# Patient Record
Sex: MALE | Race: WHITE | Employment: OTHER | URBAN - METROPOLITAN AREA
[De-identification: names, ages, dates, MRNs, and addresses within clinical notes are randomized per-mention and may not be internally consistent; named-entity substitution may affect disease eponyms.]

---

## 2023-02-15 ENCOUNTER — ANESTHESIA (OUTPATIENT)
Dept: OPERATING ROOM | Age: 63
End: 2023-02-15
Payer: COMMERCIAL

## 2023-02-15 ENCOUNTER — HOSPITAL ENCOUNTER (OUTPATIENT)
Age: 63
Setting detail: OUTPATIENT SURGERY
Discharge: HOME | End: 2023-02-15
Attending: ORTHOPAEDIC SURGERY | Admitting: ORTHOPAEDIC SURGERY
Payer: COMMERCIAL

## 2023-02-15 ENCOUNTER — ANESTHESIA EVENT (OUTPATIENT)
Dept: OPERATING ROOM | Age: 63
End: 2023-02-15
Payer: COMMERCIAL

## 2023-02-15 ENCOUNTER — HOSPITAL ENCOUNTER (OUTPATIENT)
Dept: GENERAL RADIOLOGY | Age: 63
Setting detail: OUTPATIENT SURGERY
Discharge: HOME | End: 2023-02-17
Attending: ORTHOPAEDIC SURGERY
Payer: COMMERCIAL

## 2023-02-15 VITALS
DIASTOLIC BLOOD PRESSURE: 84 MMHG | HEIGHT: 75 IN | HEART RATE: 60 BPM | WEIGHT: 180 LBS | BODY MASS INDEX: 22.38 KG/M2 | OXYGEN SATURATION: 99 % | TEMPERATURE: 97.4 F | SYSTOLIC BLOOD PRESSURE: 139 MMHG | RESPIRATION RATE: 18 BRPM

## 2023-02-15 DIAGNOSIS — R52 PAIN: ICD-10-CM

## 2023-02-15 LAB
GLUCOSE BLD-MCNC: 126 MG/DL (ref 70–99)
PERFORMED ON: ABNORMAL

## 2023-02-15 PROCEDURE — 2580000003 HC RX 258: Performed by: ORTHOPAEDIC SURGERY

## 2023-02-15 PROCEDURE — 3600000012 HC SURGERY LEVEL 2 ADDTL 15MIN: Performed by: ORTHOPAEDIC SURGERY

## 2023-02-15 PROCEDURE — 7100000010 HC PHASE II RECOVERY - FIRST 15 MIN: Performed by: ORTHOPAEDIC SURGERY

## 2023-02-15 PROCEDURE — 3700000000 HC ANESTHESIA ATTENDED CARE: Performed by: ORTHOPAEDIC SURGERY

## 2023-02-15 PROCEDURE — 2500000003 HC RX 250 WO HCPCS: Performed by: ORTHOPAEDIC SURGERY

## 2023-02-15 PROCEDURE — 2709999900 HC NON-CHARGEABLE SUPPLY: Performed by: ORTHOPAEDIC SURGERY

## 2023-02-15 PROCEDURE — 3600000002 HC SURGERY LEVEL 2 BASE: Performed by: ORTHOPAEDIC SURGERY

## 2023-02-15 PROCEDURE — 3209999900 FLUORO FOR SURGICAL PROCEDURES

## 2023-02-15 PROCEDURE — 6360000002 HC RX W HCPCS: Performed by: ORTHOPAEDIC SURGERY

## 2023-02-15 PROCEDURE — A4217 STERILE WATER/SALINE, 500 ML: HCPCS | Performed by: ORTHOPAEDIC SURGERY

## 2023-02-15 PROCEDURE — 2580000003 HC RX 258: Performed by: ANESTHESIOLOGY

## 2023-02-15 PROCEDURE — 3700000001 HC ADD 15 MINUTES (ANESTHESIA): Performed by: ORTHOPAEDIC SURGERY

## 2023-02-15 RX ORDER — GLUCAGON 1 MG/ML
1 KIT INJECTION PRN
Status: CANCELLED | OUTPATIENT
Start: 2023-02-15

## 2023-02-15 RX ORDER — LIDOCAINE HYDROCHLORIDE 10 MG/ML
10 INJECTION, SOLUTION INFILTRATION; PERINEURAL
Status: COMPLETED | OUTPATIENT
Start: 2023-02-15 | End: 2023-02-15

## 2023-02-15 RX ORDER — LABETALOL HYDROCHLORIDE 5 MG/ML
10 INJECTION, SOLUTION INTRAVENOUS
Status: CANCELLED | OUTPATIENT
Start: 2023-02-15

## 2023-02-15 RX ORDER — IPRATROPIUM BROMIDE AND ALBUTEROL SULFATE 2.5; .5 MG/3ML; MG/3ML
1 SOLUTION RESPIRATORY (INHALATION)
Status: CANCELLED | OUTPATIENT
Start: 2023-02-15 | End: 2023-02-16

## 2023-02-15 RX ORDER — ONDANSETRON 2 MG/ML
4 INJECTION INTRAMUSCULAR; INTRAVENOUS
Status: CANCELLED | OUTPATIENT
Start: 2023-02-15 | End: 2023-02-16

## 2023-02-15 RX ORDER — LIDOCAINE HYDROCHLORIDE 10 MG/ML
2 INJECTION, SOLUTION EPIDURAL; INFILTRATION; INTRACAUDAL; PERINEURAL ONCE
Status: DISCONTINUED | OUTPATIENT
Start: 2023-02-15 | End: 2023-02-15 | Stop reason: HOSPADM

## 2023-02-15 RX ORDER — FENTANYL CITRATE 0.05 MG/ML
25 INJECTION, SOLUTION INTRAMUSCULAR; INTRAVENOUS EVERY 5 MIN PRN
Status: CANCELLED | OUTPATIENT
Start: 2023-02-15

## 2023-02-15 RX ORDER — MAGNESIUM HYDROXIDE 1200 MG/15ML
LIQUID ORAL CONTINUOUS PRN
Status: COMPLETED | OUTPATIENT
Start: 2023-02-15 | End: 2023-02-15

## 2023-02-15 RX ORDER — SODIUM CHLORIDE 9 MG/ML
25 INJECTION, SOLUTION INTRAVENOUS PRN
Status: CANCELLED | OUTPATIENT
Start: 2023-02-15

## 2023-02-15 RX ORDER — HYDRALAZINE HYDROCHLORIDE 20 MG/ML
10 INJECTION INTRAMUSCULAR; INTRAVENOUS
Status: CANCELLED | OUTPATIENT
Start: 2023-02-15

## 2023-02-15 RX ORDER — CEFAZOLIN SODIUM IN 0.9 % NACL 2 G/100 ML
2000 PLASTIC BAG, INJECTION (ML) INTRAVENOUS ONCE
Status: COMPLETED | OUTPATIENT
Start: 2023-02-15 | End: 2023-02-15

## 2023-02-15 RX ORDER — ASPIRIN 81 MG/1
81 TABLET, CHEWABLE ORAL DAILY
COMMUNITY

## 2023-02-15 RX ORDER — BUPIVACAINE HYDROCHLORIDE 5 MG/ML
30 INJECTION, SOLUTION EPIDURAL; INTRACAUDAL
Status: COMPLETED | OUTPATIENT
Start: 2023-02-15 | End: 2023-02-15

## 2023-02-15 RX ORDER — METOCLOPRAMIDE HYDROCHLORIDE 5 MG/ML
10 INJECTION INTRAMUSCULAR; INTRAVENOUS
Status: CANCELLED | OUTPATIENT
Start: 2023-02-15 | End: 2023-02-16

## 2023-02-15 RX ORDER — HYDROCODONE BITARTRATE AND ACETAMINOPHEN 5; 325 MG/1; MG/1
1 TABLET ORAL EVERY 6 HOURS PRN
COMMUNITY

## 2023-02-15 RX ORDER — AMLODIPINE BESYLATE 5 MG/1
5 TABLET ORAL DAILY
COMMUNITY

## 2023-02-15 RX ORDER — SODIUM CHLORIDE 0.9 % (FLUSH) 0.9 %
5-40 SYRINGE (ML) INJECTION EVERY 12 HOURS SCHEDULED
Status: CANCELLED | OUTPATIENT
Start: 2023-02-15

## 2023-02-15 RX ORDER — DEXTROSE MONOHYDRATE 100 MG/ML
INJECTION, SOLUTION INTRAVENOUS CONTINUOUS PRN
Status: CANCELLED | OUTPATIENT
Start: 2023-02-15

## 2023-02-15 RX ORDER — SODIUM CHLORIDE, SODIUM LACTATE, POTASSIUM CHLORIDE, CALCIUM CHLORIDE 600; 310; 30; 20 MG/100ML; MG/100ML; MG/100ML; MG/100ML
INJECTION, SOLUTION INTRAVENOUS CONTINUOUS
Status: DISCONTINUED | OUTPATIENT
Start: 2023-02-15 | End: 2023-02-15 | Stop reason: HOSPADM

## 2023-02-15 RX ORDER — SODIUM CHLORIDE 0.9 % (FLUSH) 0.9 %
5-40 SYRINGE (ML) INJECTION PRN
Status: CANCELLED | OUTPATIENT
Start: 2023-02-15

## 2023-02-15 RX ADMIN — LIDOCAINE HYDROCHLORIDE 10 ML: 10 INJECTION, SOLUTION INFILTRATION; PERINEURAL at 09:08

## 2023-02-15 RX ADMIN — BUPIVACAINE HYDROCHLORIDE 10 ML: 5 INJECTION, SOLUTION EPIDURAL; INTRACAUDAL; PERINEURAL at 09:07

## 2023-02-15 RX ADMIN — SODIUM CHLORIDE, POTASSIUM CHLORIDE, SODIUM LACTATE AND CALCIUM CHLORIDE 1000 ML: 600; 310; 30; 20 INJECTION, SOLUTION INTRAVENOUS at 08:40

## 2023-02-15 RX ADMIN — Medication 2000 MG: at 09:49

## 2023-02-15 ASSESSMENT — PAIN - FUNCTIONAL ASSESSMENT: PAIN_FUNCTIONAL_ASSESSMENT: 0-10

## 2023-02-15 ASSESSMENT — PAIN SCALES - GENERAL: PAINLEVEL_OUTOF10: 0

## 2023-02-15 ASSESSMENT — PAIN DESCRIPTION - DESCRIPTORS: DESCRIPTORS: THROBBING

## 2023-02-15 NOTE — ANESTHESIA PRE PROCEDURE
Department of Anesthesiology  Preprocedure Note       Name:  Nomi Oh   Age:  57 y.o.  :  1960                                          MRN:  [id number]         Date:  2/15/2023      Surgeon: Ramírez Christine):  Suzanne Montiel DO    Procedure: Procedure(s):  LEFT INDEX FINGER EXCISIONAL DEBRIDEMENT AND REVISION OF AMPUTATION TO LEFT INDEX FINGER AND THUMB SUPINE, DIGITAL BLOCK X2, C-ARM  MAC + LOCAL    Medications prior to admission:   Prior to Admission medications    Medication Sig Start Date End Date Taking? Authorizing Provider   amLODIPine (NORVASC) 5 MG tablet Take 5 mg by mouth daily   Yes Historical Provider, MD   HYDROcodone-acetaminophen (NORCO) 5-325 MG per tablet Take 1 tablet by mouth every 6 hours as needed for Pain. Yes Historical Provider, MD   aspirin 81 MG chewable tablet Take 81 mg by mouth daily   Yes Historical Provider, MD       Current medications:    Current Facility-Administered Medications   Medication Dose Route Frequency Provider Last Rate Last Admin    lidocaine 1 % injection 10 mL  10 mL IntraDERmal Once PRN Duarte Valdes DO        ceFAZolin (ANCEF) 2000 mg in 0.9% sodium chloride 100 mL IVPB  2,000 mg IntraVENous Once Duarte Valdes DO        lactated ringers IV soln infusion   IntraVENous Continuous Nancy Barron  mL/hr at 02/15/23 0840 1,000 mL at 02/15/23 0840    lidocaine PF 1 % injection 2 mL  2 mL IntraDERmal Once Nancy Barron MD           Allergies:  No Known Allergies    Problem List:  There is no problem list on file for this patient. Past Medical History:  History reviewed. No pertinent past medical history. Past Surgical History:  No past surgical history on file.     Social History:    Social History     Tobacco Use    Smoking status: Not on file    Smokeless tobacco: Not on file   Substance Use Topics    Alcohol use: Not on file                                Counseling given: Not Answered      Vital Signs (Current):   Vitals: 02/15/23 0822   BP: (!) 187/81   Pulse: 57   Resp: 20   Temp: 97.4 °F (36.3 °C)   TempSrc: Temporal   SpO2: 99%   Weight: 180 lb (81.6 kg)   Height: 6' 3\" (1.905 m)                                              BP Readings from Last 3 Encounters:   02/15/23 (!) 187/81       NPO Status: Time of last liquid consumption: 0000                        Time of last solid consumption: 0000                        Date of last liquid consumption: 02/15/23                        Date of last solid food consumption: 02/15/23    BMI:   Wt Readings from Last 3 Encounters:   02/15/23 180 lb (81.6 kg)     Body mass index is 22.5 kg/m².     CBC: No results found for: WBC, RBC, HGB, HCT, MCV, RDW, PLT    CMP: No results found for: NA, K, CL, CO2, BUN, CREATININE, GFRAA, AGRATIO, LABGLOM, GLUCOSE, GLU, PROT, CALCIUM, BILITOT, ALKPHOS, AST, ALT    POC Tests:   Recent Labs     02/15/23  0832   POCGLU 126*       Coags: No results found for: PROTIME, INR, APTT    HCG (If Applicable): No results found for: PREGTESTUR, PREGSERUM, HCG, HCGQUANT     ABGs: No results found for: PHART, PO2ART, MGP2UZG, XIM7UWY, BEART, V4KDRBTE     Type & Screen (If Applicable):  No results found for: LABABO, LABRH    Drug/Infectious Status (If Applicable):  No results found for: HIV, HEPCAB    COVID-19 Screening (If Applicable): No results found for: COVID19        Anesthesia Evaluation  Patient summary reviewed and Nursing notes reviewed no history of anesthetic complications:   Airway: Mallampati: II  TM distance: >3 FB   Neck ROM: full  Mouth opening: > = 3 FB   Dental: normal exam         Pulmonary:Negative Pulmonary ROS and normal exam                               Cardiovascular:Negative CV ROS  Exercise tolerance: good (>4 METS),         ECG reviewed               Beta Blocker:  Not on Beta Blocker         Neuro/Psych:   Negative Neuro/Psych ROS              GI/Hepatic/Renal: Neg GI/Hepatic/Renal ROS            Endo/Other: Negative Endo/Other ROS Pt had PAT visit. Abdominal:             Vascular: negative vascular ROS. Other Findings:           Anesthesia Plan      MAC     ASA 2       Induction: intravenous. MIPS: Prophylactic antiemetics administered. Anesthetic plan and risks discussed with patient. Plan discussed with CRNA.     Attending anesthesiologist reviewed and agrees with Pre Eval content                Verna Woodard MD   2/15/2023

## 2023-02-15 NOTE — ANESTHESIA POSTPROCEDURE EVALUATION
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     Frances Phase I: Frances Score: 10    Frances Phase II:        Anesthesia Post Evaluation    Patient location during evaluation: bedside  Patient participation: complete - patient participated  Level of consciousness: awake and alert  Airway patency: patent  Nausea & Vomiting: no nausea and no vomiting  Complications: no  Cardiovascular status: blood pressure returned to baseline and hemodynamically stable  Respiratory status: acceptable  Hydration status: euvolemic

## 2023-02-15 NOTE — PROGRESS NOTES
Discharge instructions reviewed with patient and daughter. All personal belongings and discharge instructions taken with patient.

## 2023-02-15 NOTE — PROGRESS NOTES
Patient ID:  Stevie Arnold  [id number]  22 y.o.  1960  BOVIE PAD SITE CLEAR AND INTACT PRE AND POST OP. TAKEN TO PACU,   ATTACHED TO MONITOR AND REPORT GIVEN TO RN.   VSS DRSG DRY AND INTACT  GREEN TOURNI-COT APPLIED TO LEFT THUMB AT 1007  GREEN TOURNI-COT REMOVED FROM LEFT THUMB AT 1031  ORANGE TOURNI-COT APPLIED TO LEFT INDEX FINGER [id number]  ORANGE TOURNI-COT REMOVED FROM LEFT INDEX FINGER AT 1032    Electronically signed by Kalani Marrero RN on 2/15/2023

## 2023-02-15 NOTE — OP NOTE
Operative Note      Patient: Xi Baer  YOB: 1960  MRN: [id number]    Date of Procedure: 2/15/2023        Preoperative diagnosis: Left thumb and index finger necrosis at the tip    Postoperative diagnosis: Same     Procedure planned: Amputation left thumb and index finger through distal phalanx with primary closure    Procedure performed: Same    Surgeon: Dewayne Davila D.O. Assistant: HAKEEM Sanches  The physician assistant was present through the entire case. Given the nature of the disease process and the procedure to be performed a skilled surgical assistant was necessary during the case. The assistant was necessary in order to hold retractors and directly assist in the operation. A certified scrub tech was at the back table managing instruments and supplies for the surgical case. Anesthesia: Digital block monitored by the anesthesia team    Estimated blood loss: Less than 10 cc    Drains: None    Tourniquet: Turnicot's to both thumb and index for less than 30 minutes    Specimens: None    Implants: None    Indications: The patient presented with dry gangrene to the tip of his left index and thumb. Treatment options were discussed. Ultimately the patient elected to proceed forth with surgery by way of amputation to the tip of each digit to remove the necrotic tissue. Informed consent was signed and placed in the chart. Complications: None noted at the time of surgery     Description of operation: The patient was taken to the operative suite and placed in the supine position on the operating table. A timeout was performed and the left thumb and index confirmed to be the operative site. The patient was carefully positioned on the table in such a fashion as to pad all bony prominences and peripheral nerves. Patient was administered appropriate IV antibiotics and the digital blocks were working well. He was prepped and draped in the normal sterile fashion.   Attention was first turned to the thumb. A turnicot was placed at the level of the interphalangeal joint. A freer elevator device was used to remove the nail plate from the thumb using atraumatic techniques. The zone of full-thickness necrosis of skin and subcutaneous tissue was sharply removed with the 15 blade. The rongeur was then used to shorten the distal phalanx in an oblique fashion to allow for redundancy of soft tissue that would allow for primary closure. Attention was then turned to the index. Again the freer elevator device was used to atraumatically remove the nail plate. The zone of skin and soft tissue necrosis was removed with the 15 blade and rongeur. The rongeur was then used to shorten the distal phalanx to allow for redundancy of the soft tissues to allow for a tension-free primary closure. Irrigation was performed to each digit. The nailbed tissue of the thumb was then closed to the more palmar soft tissues achieving a tension-free closure. The nailbed tissue of the left index finger was then sewn to the palmar soft tissue envelope to the index achieving a tension-free closure. The turnicot's were relieved and hemostasis and viability confirmed. Loosely applied bulky soft dressings were placed. The patient was then allowed to head to recovery in stable condition. Overall the patient tolerated the procedure well.      Disposition: Stable to PACU               Electronically signed by Ricardo Quintanilla DO on 2/15/2023 at 11:34 AM

## 2023-04-02 PROBLEM — M79.641 PAIN IN BOTH HANDS: Status: ACTIVE | Noted: 2023-04-02

## 2023-04-02 PROBLEM — M79.642 PAIN IN BOTH HANDS: Status: ACTIVE | Noted: 2023-04-02

## 2023-04-02 PROBLEM — I73.00 RAYNAUD'S DISEASE WITHOUT GANGRENE: Status: ACTIVE | Noted: 2023-04-02

## 2023-04-02 PROBLEM — E11.9 DIABETES MELLITUS (MULTI): Status: ACTIVE | Noted: 2023-04-02

## 2023-04-02 PROBLEM — S61.208A OPEN WOUND OF INDEX FINGER: Status: ACTIVE | Noted: 2023-04-02

## 2023-04-02 PROBLEM — R31.9 HEMATURIA: Status: ACTIVE | Noted: 2023-04-02

## 2023-04-02 PROBLEM — L03.012 CELLULITIS OF LEFT INDEX FINGER: Status: ACTIVE | Noted: 2023-04-02

## 2023-04-02 PROBLEM — K80.50 CHOLEDOCHOLITHIASIS: Status: ACTIVE | Noted: 2023-04-02

## 2023-04-02 PROBLEM — L72.3 SEBACEOUS CYST: Status: ACTIVE | Noted: 2023-04-02

## 2023-04-02 PROBLEM — D13.5 AMPULLARY ADENOMA: Status: ACTIVE | Noted: 2023-04-02

## 2023-04-02 PROBLEM — F17.210 TOBACCO DEPENDENCE DUE TO CIGARETTES: Status: ACTIVE | Noted: 2023-04-02

## 2023-04-02 PROBLEM — L90.5 SCAR OF NOSE: Status: ACTIVE | Noted: 2023-04-02

## 2023-04-02 PROBLEM — M51.369 DISC DEGENERATION, LUMBAR: Status: ACTIVE | Noted: 2023-04-02

## 2023-04-02 PROBLEM — K83.1: Status: ACTIVE | Noted: 2023-04-02

## 2023-04-02 PROBLEM — K86.1 CHRONIC PANCREATITIS (MULTI): Status: ACTIVE | Noted: 2023-04-02

## 2023-04-02 PROBLEM — M79.645 PAIN OF FINGER OF LEFT HAND: Status: ACTIVE | Noted: 2023-04-02

## 2023-04-02 PROBLEM — K83.1 BILIARY STRICTURE (CMS-HCC): Status: ACTIVE | Noted: 2023-04-02

## 2023-04-02 PROBLEM — K61.1 PERIRECTAL ABSCESS: Status: ACTIVE | Noted: 2023-04-02

## 2023-04-02 PROBLEM — L08.9 FINGER INFECTION: Status: ACTIVE | Noted: 2023-04-02

## 2023-04-02 PROBLEM — R97.20 ELEVATED PSA: Status: ACTIVE | Noted: 2023-04-02

## 2023-04-02 PROBLEM — M51.36 DISC DEGENERATION, LUMBAR: Status: ACTIVE | Noted: 2023-04-02

## 2023-04-02 PROBLEM — D12.6 SERRATED ADENOMA OF COLON: Status: ACTIVE | Noted: 2023-04-02

## 2023-04-02 PROBLEM — R93.1 HIGH CORONARY ARTERY CALCIUM SCORE: Status: ACTIVE | Noted: 2023-04-02

## 2023-04-02 RX ORDER — AMLODIPINE BESYLATE 10 MG/1
10 TABLET ORAL DAILY
COMMUNITY
End: 2024-02-10 | Stop reason: SDUPTHER

## 2023-04-02 RX ORDER — MUPIROCIN 20 MG/G
OINTMENT TOPICAL
COMMUNITY
End: 2024-05-23 | Stop reason: WASHOUT

## 2023-04-03 ENCOUNTER — LAB (OUTPATIENT)
Dept: LAB | Facility: LAB | Age: 63
End: 2023-04-03
Payer: MEDICARE

## 2023-04-03 ENCOUNTER — OFFICE VISIT (OUTPATIENT)
Dept: PRIMARY CARE | Facility: CLINIC | Age: 63
End: 2023-04-03
Payer: MEDICARE

## 2023-04-03 DIAGNOSIS — M79.641 PAIN IN BOTH HANDS: ICD-10-CM

## 2023-04-03 DIAGNOSIS — L03.012 CELLULITIS OF LEFT INDEX FINGER: ICD-10-CM

## 2023-04-03 DIAGNOSIS — I73.00 RAYNAUD'S DISEASE WITHOUT GANGRENE: ICD-10-CM

## 2023-04-03 DIAGNOSIS — M79.642 PAIN IN BOTH HANDS: ICD-10-CM

## 2023-04-03 DIAGNOSIS — L08.9 FINGER INFECTION: Primary | ICD-10-CM

## 2023-04-03 DIAGNOSIS — L03.011: ICD-10-CM

## 2023-04-03 DIAGNOSIS — Z00.00 ROUTINE HEALTH MAINTENANCE: ICD-10-CM

## 2023-04-03 DIAGNOSIS — L60.9 FINGERNAIL ABNORMALITIES: ICD-10-CM

## 2023-04-03 LAB
ALANINE AMINOTRANSFERASE (SGPT) (U/L) IN SER/PLAS: 15 U/L (ref 10–52)
ALBUMIN (G/DL) IN SER/PLAS: 4.5 G/DL (ref 3.4–5)
ALKALINE PHOSPHATASE (U/L) IN SER/PLAS: 65 U/L (ref 33–136)
ANION GAP IN SER/PLAS: 11 MMOL/L (ref 10–20)
ASPARTATE AMINOTRANSFERASE (SGOT) (U/L) IN SER/PLAS: 14 U/L (ref 9–39)
BILIRUBIN TOTAL (MG/DL) IN SER/PLAS: 0.3 MG/DL (ref 0–1.2)
C REACTIVE PROTEIN (MG/L) IN SER/PLAS: <0.1 MG/DL
CALCIUM (MG/DL) IN SER/PLAS: 9.8 MG/DL (ref 8.6–10.3)
CARBON DIOXIDE, TOTAL (MMOL/L) IN SER/PLAS: 28 MMOL/L (ref 21–32)
CHLORIDE (MMOL/L) IN SER/PLAS: 103 MMOL/L (ref 98–107)
CREATININE (MG/DL) IN SER/PLAS: 0.71 MG/DL (ref 0.5–1.3)
ERYTHROCYTE DISTRIBUTION WIDTH (RATIO) BY AUTOMATED COUNT: 15.8 % (ref 11.5–14.5)
ERYTHROCYTE MEAN CORPUSCULAR HEMOGLOBIN CONCENTRATION (G/DL) BY AUTOMATED: 33.8 G/DL (ref 32–36)
ERYTHROCYTE MEAN CORPUSCULAR VOLUME (FL) BY AUTOMATED COUNT: 101 FL (ref 80–100)
ERYTHROCYTES (10*6/UL) IN BLOOD BY AUTOMATED COUNT: 3.57 X10E12/L (ref 4.5–5.9)
GFR MALE: >90 ML/MIN/1.73M2
GLUCOSE (MG/DL) IN SER/PLAS: 150 MG/DL (ref 74–99)
HEMATOCRIT (%) IN BLOOD BY AUTOMATED COUNT: 36.1 % (ref 41–52)
HEMOGLOBIN (G/DL) IN BLOOD: 12.2 G/DL (ref 13.5–17.5)
LEUKOCYTES (10*3/UL) IN BLOOD BY AUTOMATED COUNT: 8.9 X10E9/L (ref 4.4–11.3)
PLATELETS (10*3/UL) IN BLOOD AUTOMATED COUNT: 284 X10E9/L (ref 150–450)
POTASSIUM (MMOL/L) IN SER/PLAS: 4.3 MMOL/L (ref 3.5–5.3)
PROTEIN TOTAL: 7.2 G/DL (ref 6.4–8.2)
SEDIMENTATION RATE, ERYTHROCYTE: 14 MM/H (ref 0–20)
SODIUM (MMOL/L) IN SER/PLAS: 138 MMOL/L (ref 136–145)
UREA NITROGEN (MG/DL) IN SER/PLAS: 9 MG/DL (ref 6–23)

## 2023-04-03 PROCEDURE — 99214 OFFICE O/P EST MOD 30 MIN: CPT | Performed by: STUDENT IN AN ORGANIZED HEALTH CARE EDUCATION/TRAINING PROGRAM

## 2023-04-03 PROCEDURE — 1036F TOBACCO NON-USER: CPT | Performed by: STUDENT IN AN ORGANIZED HEALTH CARE EDUCATION/TRAINING PROGRAM

## 2023-04-03 PROCEDURE — 85027 COMPLETE CBC AUTOMATED: CPT

## 2023-04-03 PROCEDURE — 85652 RBC SED RATE AUTOMATED: CPT

## 2023-04-03 PROCEDURE — 80053 COMPREHEN METABOLIC PANEL: CPT

## 2023-04-03 PROCEDURE — 86140 C-REACTIVE PROTEIN: CPT

## 2023-04-03 PROCEDURE — 36415 COLL VENOUS BLD VENIPUNCTURE: CPT

## 2023-04-03 RX ORDER — CEPHALEXIN 500 MG/1
500 CAPSULE ORAL 4 TIMES DAILY
Qty: 28 CAPSULE | Refills: 0 | Status: SHIPPED | OUTPATIENT
Start: 2023-04-03 | End: 2023-04-10

## 2023-04-03 RX ORDER — DOXYCYCLINE 100 MG/1
100 CAPSULE ORAL 2 TIMES DAILY
Qty: 14 CAPSULE | Refills: 0 | Status: SHIPPED | OUTPATIENT
Start: 2023-04-03 | End: 2023-04-10

## 2023-04-03 ASSESSMENT — ENCOUNTER SYMPTOMS
SHORTNESS OF BREATH: 0
WEAKNESS: 0
CHILLS: 0
VOMITING: 0
DIAPHORESIS: 0
NUMBNESS: 0
NAUSEA: 0
FEVER: 0

## 2023-04-03 ASSESSMENT — PATIENT HEALTH QUESTIONNAIRE - PHQ9
SUM OF ALL RESPONSES TO PHQ9 QUESTIONS 1 AND 2: 0
2. FEELING DOWN, DEPRESSED OR HOPELESS: NOT AT ALL
1. LITTLE INTEREST OR PLEASURE IN DOING THINGS: NOT AT ALL

## 2023-04-03 NOTE — PROGRESS NOTES
Subjective   Patient ID: Vega Rowe is a 62 y.o. male who presents for Nail Problem.  He is here for an infection in his 2-4 right hand nails. It's been present for a few months. Areas of darker discoloration around nails and hasn't changed significantly. The nails hurt during the day and night. He uses hydrogen peroxide and an ointment over the counter for his fingers. He doesn't remember any inciting event for this. 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 Doesn't remember cutting his fingers. His right index finger hurts the most and started as a nail separation from the nail bed.         Review of Systems   Constitutional:  Negative for chills, diaphoresis and fever.   HENT:  Negative for hearing loss.    Eyes:  Negative for visual disturbance.   Respiratory:  Negative for shortness of breath.    Cardiovascular:  Negative for chest pain.   Gastrointestinal:  Negative for nausea and vomiting.   Skin:  Negative for rash.   Neurological:  Negative for weakness and numbness.       There were no vitals taken for this visit.    Objective   Physical Exam  Vitals reviewed.   Constitutional:       General: He is not in acute distress.     Appearance: Normal appearance.   HENT:      Head: Normocephalic.   Cardiovascular:      Rate and Rhythm: Normal rate and regular rhythm.   Pulmonary:      Effort: Pulmonary effort is normal. No respiratory distress.      Breath sounds: Normal breath sounds.   Abdominal:      General: There is no distension.   Musculoskeletal:         General: No deformity.   Skin:     Coloration: Skin is not jaundiced.      Comments: Right first and second digits with distal scabbing, no open wounds present.  Left second third and fourth digits with raised nailbeds thickening under nail beds, third left digit with slight drainage, tender to palpation, nailbeds with yellow and brownish discoloration in the middle of the nailbeds.  Right third finger with yellow and brownish  discolored nailbed and slightly thickened nail bed. Sensation grossly intact to both hands bilaterally.     Neurological:      Mental Status: He is alert.         Assessment/Plan   Problem List Items Addressed This Visit          Circulatory    Raynaud's disease without gangrene    Relevant Orders    Referral to Rheumatology    XR hand left 3+ views       Musculoskeletal    Cellulitis of left index finger    Relevant Orders    XR hand left 3+ views    Follow Up In [facility] - PCP    Referral to Dermatology    Finger infection - Primary    Relevant Medications    doxycycline (Vibramycin) 100 mg capsule    cephalexin (Keflex) 500 mg capsule    Other Relevant Orders    Referral to Wound Clinic    Pain in both hands    Relevant Orders    Referral to Rheumatology    Cellulitis of fingernail, right     Discussed that I am extremely concerned about his finger infection especially considering that because he lost insurance he was unable to follow-up with orthopedics as instructed.  The findings on his left nails seems consistent with a bacterial infection under the fingernails.  His right first and second digits have chronic findings that do not appear to be acutely infected but need to be seen by wound care and his orthopedic specialist as is out of my expertise to manage this further.  We will empirically treat with Keflex and doxycycline for 7 days.  I referred him to wound care, dermatology as the nails may not be an infection and could be some other cause, and rheumatology for the Raynaud's phenomenon.  I also ordered x-rays of his hands bilaterally as these were requested by his orthopedist a month and a half ago.  I ordered lab work to help rule out osteomyelitis in addition with the x-rays as above.  Discussed the immediate emergency to follow-up with orthopedics, wound care, and dermatology.  Advised him to call me with any questions or concerns in the meantime.  ER for fevers, chills, sweats.          Relevant Orders    CBC    Comprehensive Metabolic Panel    Sedimentation Rate    C-Reactive Protein    XR hand right 3+ views    Referral to Wound Clinic       Other    Routine health maintenance     Advised follow up with Dr. Sena in 1-2 months.         Relevant Orders    Follow Up In [facility] - PCP     Other Visit Diagnoses       Fingernail abnormalities        Relevant Orders    Referral to Dermatology

## 2023-04-03 NOTE — ASSESSMENT & PLAN NOTE
Discussed that I am extremely concerned about his finger infection especially considering that because he lost insurance he was unable to follow-up with orthopedics as instructed.  The findings on his left nails seems consistent with a bacterial infection under the fingernails.  His right first and second digits have chronic findings that do not appear to be acutely infected but need to be seen by wound care and his orthopedic specialist as is out of my expertise to manage this further.  We will empirically treat with Keflex and doxycycline for 7 days.  I referred him to wound care, dermatology as the nails may not be an infection and could be some other cause, and rheumatology for the Raynaud's phenomenon.  I also ordered x-rays of his hands bilaterally as these were requested by his orthopedist a month and a half ago.  I ordered lab work to help rule out osteomyelitis in addition with the x-rays as above.  Discussed the immediate emergency to follow-up with orthopedics, wound care, and dermatology.  Advised him to call me with any questions or concerns in the meantime.  ER for fevers, chills, sweats.

## 2023-04-04 ENCOUNTER — TELEPHONE (OUTPATIENT)
Dept: PRIMARY CARE | Facility: CLINIC | Age: 63
End: 2023-04-04
Payer: MEDICARE

## 2023-04-04 NOTE — TELEPHONE ENCOUNTER
----- Message from Laureano Wiggins DO sent at 4/4/2023  8:16 AM EDT -----  Please let him know the blood work doesn't show signs of a serious infection which is good. It shows some slightly elevated blood sugars and a slightly decreased hemoglobin, or red blood cells. They're not urgent and Dr. Sena can discuss at next appointment.

## 2023-04-05 ENCOUNTER — TELEPHONE (OUTPATIENT)
Dept: PRIMARY CARE | Facility: CLINIC | Age: 63
End: 2023-04-05
Payer: MEDICARE

## 2023-05-03 ENCOUNTER — APPOINTMENT (OUTPATIENT)
Dept: PRIMARY CARE | Facility: CLINIC | Age: 63
End: 2023-05-03
Payer: MEDICARE

## 2023-06-06 PROBLEM — I96: Status: ACTIVE | Noted: 2023-06-06

## 2023-06-06 PROBLEM — I99.8 ISCHEMIA OF DIGITS OF HAND: Status: ACTIVE | Noted: 2023-06-06

## 2023-06-06 PROBLEM — I10 ACCELERATED HYPERTENSION: Status: ACTIVE | Noted: 2023-06-06

## 2023-06-06 PROBLEM — R09.89 BILATERAL CAROTID BRUITS: Status: ACTIVE | Noted: 2023-06-06

## 2023-06-06 PROBLEM — G89.18 POST-OP PAIN: Status: ACTIVE | Noted: 2023-06-06

## 2023-06-06 RX ORDER — IBUPROFEN 600 MG/1
600 TABLET ORAL 3 TIMES DAILY
COMMUNITY

## 2023-06-06 RX ORDER — NITROGLYCERIN 20 MG/G
0.5 OINTMENT TOPICAL EVERY 6 HOURS SCHEDULED
COMMUNITY
Start: 2023-05-03 | End: 2023-10-27 | Stop reason: SDUPTHER

## 2023-06-06 NOTE — PROGRESS NOTES
Subjective   Patient ID: Vega Rowe is a 63 y.o. male who presents for his Welcome to Medicare Assessment and follow up S/p revision of amputation and excisional debridement to left index finger and left thumb 2/15/2023 w/ Dr Fonseca           He quit smoking after all the issues with necrosis to his finger tips     He drinks approx 6 beers daily. He admits he is drinking more due to the pain he is in  He is taking Tylenol for pain also   His fingers burn terribly at night and the slightest touch to something is painful     Last seen in 2023 at [facility]. He has new Rx for glasses.    He did not let them dilate his eyes.      HEALTH:  PSA 4/16 2.9, 4/17 , 4/18 , 5/19, 5/2020, 6/2021, 6/2022, ordered 6/2023   COLON 10/18, 10/19 showed polyps and Q 3 - declines repeating   EKG 10/3/16 , 3/18, 12/2020, 7/2021, 6/2023  Ca cardiac score 5/19 was 2537.4   Urine 3/19 , 6/2020  Urine protein 3/19 -, 6/2020, 6/2021, 7/2022   FLU declines   TDAP ? 2014   Hep B 2006 x 3 and sick after   Hep C 5/18 -  Prevnar never   Pneumovax never   Shingles declines  CVD vaccine declines   Ophth - Last seen in 2023 at [facility]. He has new Rx for glasses. No glaucoma or MD.   He did not let them dilate his eyes.       Patient Care Team:  Kathleen Sena MD as PCP - General     Review of Systems  All systems negative except those listed in the HPI      Past Medical, Surgical, and Family History reviewed and updated in chart.  Reviewed all medications by prescribing practitioner or clinical pharmacist   (such as prescriptions, OTCs, herbal therapies and supplements) and documented in the medical record      Objective   Vitals:  /70 (BP Location: Right arm, Patient Position: Sitting, BP Cuff Size: Adult)   Pulse 90   Temp 35.9 °C (96.7 °F) (Skin)   Wt 80.3 kg (177 lb)   SpO2 99%   BMI 22.73 kg/m²       Physical Exam  Vitals reviewed.   Constitutional:       Appearance: Normal appearance. He is normal weight.   HENT:       Head: Normocephalic.      Right Ear: Tympanic membrane, ear canal and external ear normal.      Left Ear: Tympanic membrane, ear canal and external ear normal.      Nose: Nose normal.      Mouth/Throat:      Pharynx: Oropharynx is clear.   Eyes:      Conjunctiva/sclera: Conjunctivae normal.   Cardiovascular:      Rate and Rhythm: Normal rate and regular rhythm.      Pulses: Normal pulses.      Heart sounds: Normal heart sounds.   Pulmonary:      Effort: Pulmonary effort is normal.      Breath sounds: Normal breath sounds.   Abdominal:      General: Bowel sounds are normal.      Palpations: Abdomen is soft.   Musculoskeletal:         General: Normal range of motion.      Cervical back: Normal range of motion and neck supple.      Comments: all fingers are erythematous, edematous at the tips, the nails are coming off and various stages of separation and ecchymosis, thumb right hand wrapped    Skin:     General: Skin is warm.      Comments: Multiple moles on his back: Nothing suspicious on exam   Left forearm ecchymosis up to the elbow, scabbed and healing, no infection noted    Neurological:      General: No focal deficit present.      Mental Status: He is alert and oriented to person, place, and time.      Comments: Diabetic foot exam is normal 6/2023.    Psychiatric:         Mood and Affect: Mood normal.         Behavior: Behavior normal.         Thought Content: Thought content normal.         Judgment: Judgment normal.       Assessment/Plan   Problem List Items Addressed This Visit       Accelerated hypertension    Chronic pancreatitis (CMS/HCC)    Diabetes mellitus (CMS/HCC)    Relevant Orders    CBC and Auto Differential    Comprehensive Metabolic Panel    Hemoglobin A1C    Lipid Panel    TSH with reflex to Free T4 if abnormal    Elevated PSA    Relevant Orders    Prostate Specific Antigen, Screen    Gangrene of finger (CMS/HCC)    High coronary artery calcium score    Relevant Medications    nitroglycerin  (Nitro-Bid) 2 % ointment    Ischemia of digits of hand    Pain in both hands    Routine health maintenance - Primary     Other Visit Diagnoses       Hyperlipidemia, unspecified hyperlipidemia type               Welcome to Medicare Assessment and follow up completed   Reviewed his labs from 4/2023   Labs ordered     Medicare Wellness completed  -  Discussed healthy diet and regular exercise.    -  Physical exam overall unremarkable. Immunizations reviewed and updated accordingly. Healthy lifestyle choices discussed (tobacco avoidance, appropriate alcohol use, avoidance of illicit substances).   -  Patient is wearing seatbelt.   -  Screening lab work ordered as indicated.    -  Age appropriate screening tests reviewed with patient.        We spent 15 minutes discussing depression screen and there is nothing found that is of concern for underling depression. The PQH form was filled and the meds reviewed  No depression to report     We spent 15 minutes discussing alcohol use and there are no concerns about overuse. The 15 min was spent in going over any issues of use of alcohol  He drinks 6 beers a day. Because of his history with pancreatitis I am concerned for the amount he drinks. Recommend he cut back EtOH     He has grab bars in the shower.  He has not fallen recently and no risk of falls in the house   He has good lighting around the house and functioning smoke detectors.     He drinks approx 6 beers daily. He is self medicating   He admits he is drinking more due to the pain he is in  He is taking Tylenol for pain also   His fingers burn terribly at night and the slightest touch is painful   Warned patient of beer intake and taking Tylenol.   Recommend he avoid Tylenol with the amount of EtOH he is drinking   Because of his history with pancreatitis I am concerned for the amount he drinks  Recommend he cut back EtOH   Prescription sent in gabapentin 600 mg TID to see if this helps with his pain and allows him to  cut back on EtOH     He tripped 6/2023: On exam: Left forearm ecchymosis up to the elbow, scabbed and healing, no infection noted 6/2023.   He states he does not fall, he trips.  He did not seek medical attention after fall     Necrosis to tips of digits: He quit smoking. On exam: all fingers are erythematous, edematous at the tips, the nails are coming off and various stages of separation and ecchymosis, thumb right hand wrapped 6/2023  S/p revision of amputation and excisional debridement to left index finger and left thumb 2/15/2023 w/ Dr Fonseca  AAA screening 5/2023 negative for aneurysm   Carotid doppler 5/2023 showed < 50% stenosis bilaterally   PVR studies 4/2023 showed bilateral Monophasic waveforms noted left Radial and Ulnar arteries at wrist. Decreased PPG's noted in digits one through five.    Continue amlodipine 10 mg daily, ASA 81 mg daily and NTG catalina as directed    He saw Dr Ramirez in 4/2023 and per notes: his brachial pulses appear to be equal. His right radial and right ulnar pulse are much weaker than his left. Doppler signals are monophasic in the palmar arch on the right. He has digital gangrene and majority of his upper extremity digits bilaterally. I am concerned that he does have Buerger's disease. He is a smoker and continues to smoke 3 to 4 cigarettes a day. I do not feel that this is rainouts disease. I would like to obtain upper extremity PVRs with digital waveforms. I would also like to send him to vascular medicine.   He saw Dr eRgan in 6/2023 and per notes: Pt's sx have improved and his gangrenous tips has improved with Nitro-Bid. Pt has been taking Norvasc without any significant side effects. 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     His weight/ BMI is in normal range in office today, recommend he maintain     BP in normal range in office. We will continue to monitor   EKG was sinus, first degree block, no LVH or strain pattern 6/2023  Recommend he monitor his BP at home and call with elevated readings     The patient's 10 yr CV risk was estimated at 12%. We can decrease this to 11% if he just quits smoking. He is working on quitting within in the year 7/2022     Elevated lipids: Labs ordered and we will adjust if indicated  6/2023   Explained though his cholesterol levels are not high his Ca cardiac score is and he would benefit from a statin.  He did not like the side effects with statin medication in the past and does not want to add another medication.   Statin medication gave him nightmares.   He declines seeing a cardiologist.   Ca cardiac score in 5/19 was 2537.4.   Stress test not covered and declines testing  Recommend he add ASA 81 mg daily.     Type II DM: Labs ordered and we will adjust if indicated  6/2023. Diabetic Composite Score 3/5 IO 6/2023   Improved with weight loss. He has been off all medications for years.   He had GI issues with metformin and declines taking again   Recommend he monitor his feet nightly for skin breakdown or ulcerations  Spot urine in 7/2022 and ordered 6/2023     GERD:   He states certain foods upset his GI so he avoids these foods  Red sauces and greasy food gives him abdominal pain and diarrhea  He eats mostly baked chicken   He declines repeating EGD     Chronic Pancreatitis : He will continue to monitor closely with Dr Cooper   CT urogram 4/19 confirms chronic changes   He is drinking 6 beers a day.   CT of abdomen 6/2021 showed intra and extra hepatic biliary dilation with filling defect, chronic pancreatitis, no masses, mild wall thickening of the bladder   ERCP 10/2021 showed 1 stent in the  common bile duct, 1 partially occluded stent from the biliary tree in major papilla. Multiple stones noted in the gallbladder body. Some low grade atypical cells noted. Bx has not been finalized 12/2021.  He saw Dr Cooper in 2/2022 and ERCP in 3/2022 showed Bx of ampulla notable for dysplasia, adenoma- like   S/p ERCP on 5/2/2022, Bx negative for high grade dysplasia and malignancy     He was seen by Dr Dsouza in urology for hematuria   Microscopy was negative   CT Urogram was + for enlarged prostate and some outlet obstruction   Urine 6/2021 showed moderate hematuria, he will return for cytology and urine Cx to be done.  PSA 6/2022 elevated at 4.9. He has is low risk for cancer. Ordered 6/2023   He will call my office if he notices any changes.     Vitamin D def:  Recommend taking OTC Vitamin D 2000 UT daily     Multiple moles on his back: Nothing suspicious on exam 6/2023  We will continue to monitor.   Recommend derm yearly for skin checks, he does not want to see derm     Colonoscopy in 10/19 showed polyps only and Q 3 - declines repeating    He wants this to be his last colonoscopy   The anal lesion he had was infected and drained in 4/19 and no issues since     Ophth:    Last seen in 2023 at [facility]. He has new Rx for glasses. No glaucoma or MD.   He did not let them dilate his eyes.   Recommend he bring a copy of his last eye exam in office to have scanned in his chart     I spent 15 min with the patient discussing their wishes for end of life choices.   We discussed the need for a Living Will and that wishes should be discussed with Family. The DNR status was reviewed, and we discussed the options of this and, the DNR _CC options as well.   We also went over how important it was to have these choices written down and clear for any surviving family so that their wishes are followed   The patient and I came to to following agreement : He will have his daughter as his MPOA. I provided the forms for patient  to complete and have notarized 6/2023. Recommend he bring a copy of his Advanced Directives in office to have scanned in his chart 6/2023    FLU declines   TDAP ?2014   Hep B 2006 x 3 and sick after   Hep C 5/18 -  Prevnar never   Pneumovax never   Shingles declines  CVD vaccine declines     Scribe Attestation  By signing my name below, INathalia , Scribe   attest that this documentation has been prepared under the direction and in the presence of Kathleen Sena MD.

## 2023-06-07 ENCOUNTER — LAB (OUTPATIENT)
Dept: LAB | Facility: LAB | Age: 63
End: 2023-06-07
Payer: MEDICARE

## 2023-06-07 ENCOUNTER — OFFICE VISIT (OUTPATIENT)
Dept: PRIMARY CARE | Facility: CLINIC | Age: 63
End: 2023-06-07
Payer: MEDICARE

## 2023-06-07 VITALS
SYSTOLIC BLOOD PRESSURE: 120 MMHG | HEART RATE: 90 BPM | DIASTOLIC BLOOD PRESSURE: 70 MMHG | OXYGEN SATURATION: 99 % | TEMPERATURE: 96.7 F | WEIGHT: 177 LBS | BODY MASS INDEX: 22.73 KG/M2

## 2023-06-07 DIAGNOSIS — E11.9 TYPE 2 DIABETES MELLITUS WITHOUT COMPLICATION, WITHOUT LONG-TERM CURRENT USE OF INSULIN (MULTI): ICD-10-CM

## 2023-06-07 DIAGNOSIS — I10 ACCELERATED HYPERTENSION: ICD-10-CM

## 2023-06-07 DIAGNOSIS — R97.20 ELEVATED PSA: ICD-10-CM

## 2023-06-07 DIAGNOSIS — M79.642 PAIN IN BOTH HANDS: ICD-10-CM

## 2023-06-07 DIAGNOSIS — M79.641 PAIN IN BOTH HANDS: ICD-10-CM

## 2023-06-07 DIAGNOSIS — R93.1 HIGH CORONARY ARTERY CALCIUM SCORE: ICD-10-CM

## 2023-06-07 DIAGNOSIS — I99.8 ISCHEMIA OF DIGITS OF HAND: ICD-10-CM

## 2023-06-07 DIAGNOSIS — I96: ICD-10-CM

## 2023-06-07 DIAGNOSIS — Z00.00 ROUTINE HEALTH MAINTENANCE: Primary | ICD-10-CM

## 2023-06-07 DIAGNOSIS — K86.0 ALCOHOL-INDUCED CHRONIC PANCREATITIS (MULTI): ICD-10-CM

## 2023-06-07 DIAGNOSIS — E78.5 HYPERLIPIDEMIA, UNSPECIFIED HYPERLIPIDEMIA TYPE: ICD-10-CM

## 2023-06-07 PROBLEM — S61.208A OPEN WOUND OF INDEX FINGER: Status: RESOLVED | Noted: 2023-04-02 | Resolved: 2023-06-07

## 2023-06-07 PROBLEM — I73.00 RAYNAUD'S DISEASE WITHOUT GANGRENE: Status: RESOLVED | Noted: 2023-04-02 | Resolved: 2023-06-07

## 2023-06-07 PROBLEM — L08.9 FINGER INFECTION: Status: RESOLVED | Noted: 2023-04-02 | Resolved: 2023-06-07

## 2023-06-07 PROBLEM — L03.012 CELLULITIS OF LEFT INDEX FINGER: Status: RESOLVED | Noted: 2023-04-02 | Resolved: 2023-06-07

## 2023-06-07 PROBLEM — L03.011: Status: RESOLVED | Noted: 2023-04-03 | Resolved: 2023-06-07

## 2023-06-07 LAB
ALANINE AMINOTRANSFERASE (SGPT) (U/L) IN SER/PLAS: 22 U/L (ref 10–52)
ALBUMIN (G/DL) IN SER/PLAS: 4.6 G/DL (ref 3.4–5)
ALKALINE PHOSPHATASE (U/L) IN SER/PLAS: 68 U/L (ref 33–136)
ANION GAP IN SER/PLAS: 14 MMOL/L (ref 10–20)
ASPARTATE AMINOTRANSFERASE (SGOT) (U/L) IN SER/PLAS: 23 U/L (ref 9–39)
BASOPHILS (10*3/UL) IN BLOOD BY AUTOMATED COUNT: 0.07 X10E9/L (ref 0–0.1)
BASOPHILS/100 LEUKOCYTES IN BLOOD BY AUTOMATED COUNT: 1 % (ref 0–2)
BILIRUBIN TOTAL (MG/DL) IN SER/PLAS: 0.4 MG/DL (ref 0–1.2)
CALCIUM (MG/DL) IN SER/PLAS: 9.9 MG/DL (ref 8.6–10.3)
CARBON DIOXIDE, TOTAL (MMOL/L) IN SER/PLAS: 26 MMOL/L (ref 21–32)
CHLORIDE (MMOL/L) IN SER/PLAS: 101 MMOL/L (ref 98–107)
CHOLESTEROL (MG/DL) IN SER/PLAS: 139 MG/DL (ref 0–199)
CHOLESTEROL IN HDL (MG/DL) IN SER/PLAS: 71.1 MG/DL
CHOLESTEROL/HDL RATIO: 2
CREATININE (MG/DL) IN SER/PLAS: 0.71 MG/DL (ref 0.5–1.3)
EOSINOPHILS (10*3/UL) IN BLOOD BY AUTOMATED COUNT: 0.29 X10E9/L (ref 0–0.7)
EOSINOPHILS/100 LEUKOCYTES IN BLOOD BY AUTOMATED COUNT: 4.3 % (ref 0–6)
ERYTHROCYTE DISTRIBUTION WIDTH (RATIO) BY AUTOMATED COUNT: 12.4 % (ref 11.5–14.5)
ERYTHROCYTE MEAN CORPUSCULAR HEMOGLOBIN CONCENTRATION (G/DL) BY AUTOMATED: 34.2 G/DL (ref 32–36)
ERYTHROCYTE MEAN CORPUSCULAR VOLUME (FL) BY AUTOMATED COUNT: 103 FL (ref 80–100)
ERYTHROCYTES (10*6/UL) IN BLOOD BY AUTOMATED COUNT: 3.51 X10E12/L (ref 4.5–5.9)
GFR MALE: >90 ML/MIN/1.73M2
GLUCOSE (MG/DL) IN SER/PLAS: 104 MG/DL (ref 74–99)
HEMATOCRIT (%) IN BLOOD BY AUTOMATED COUNT: 36.3 % (ref 41–52)
HEMOGLOBIN (G/DL) IN BLOOD: 12.4 G/DL (ref 13.5–17.5)
IMMATURE GRANULOCYTES/100 LEUKOCYTES IN BLOOD BY AUTOMATED COUNT: 0.3 % (ref 0–0.9)
LDL: 55 MG/DL (ref 0–99)
LEUKOCYTES (10*3/UL) IN BLOOD BY AUTOMATED COUNT: 6.7 X10E9/L (ref 4.4–11.3)
LYMPHOCYTES (10*3/UL) IN BLOOD BY AUTOMATED COUNT: 1.34 X10E9/L (ref 1.2–4.8)
LYMPHOCYTES/100 LEUKOCYTES IN BLOOD BY AUTOMATED COUNT: 20.1 % (ref 13–44)
MONOCYTES (10*3/UL) IN BLOOD BY AUTOMATED COUNT: 0.51 X10E9/L (ref 0.1–1)
MONOCYTES/100 LEUKOCYTES IN BLOOD BY AUTOMATED COUNT: 7.6 % (ref 2–10)
NEUTROPHILS (10*3/UL) IN BLOOD BY AUTOMATED COUNT: 4.44 X10E9/L (ref 1.2–7.7)
NEUTROPHILS/100 LEUKOCYTES IN BLOOD BY AUTOMATED COUNT: 66.7 % (ref 40–80)
PLATELETS (10*3/UL) IN BLOOD AUTOMATED COUNT: 251 X10E9/L (ref 150–450)
POTASSIUM (MMOL/L) IN SER/PLAS: 4.2 MMOL/L (ref 3.5–5.3)
PROSTATE SPECIFIC ANTIGEN,SCREEN: 4.1 NG/ML (ref 0–4)
PROTEIN TOTAL: 7.7 G/DL (ref 6.4–8.2)
SODIUM (MMOL/L) IN SER/PLAS: 137 MMOL/L (ref 136–145)
THYROTROPIN (MIU/L) IN SER/PLAS BY DETECTION LIMIT <= 0.05 MIU/L: 0.83 MIU/L (ref 0.44–3.98)
TRIGLYCERIDE (MG/DL) IN SER/PLAS: 63 MG/DL (ref 0–149)
UREA NITROGEN (MG/DL) IN SER/PLAS: 16 MG/DL (ref 6–23)
VLDL: 13 MG/DL (ref 0–40)

## 2023-06-07 PROCEDURE — 3074F SYST BP LT 130 MM HG: CPT | Performed by: INTERNAL MEDICINE

## 2023-06-07 PROCEDURE — G0403 EKG FOR INITIAL PREVENT EXAM: HCPCS | Performed by: INTERNAL MEDICINE

## 2023-06-07 PROCEDURE — 80061 LIPID PANEL: CPT

## 2023-06-07 PROCEDURE — 1159F MED LIST DOCD IN RCRD: CPT | Performed by: INTERNAL MEDICINE

## 2023-06-07 PROCEDURE — 1170F FXNL STATUS ASSESSED: CPT | Performed by: INTERNAL MEDICINE

## 2023-06-07 PROCEDURE — G0442 ANNUAL ALCOHOL SCREEN 15 MIN: HCPCS | Performed by: INTERNAL MEDICINE

## 2023-06-07 PROCEDURE — G0402 INITIAL PREVENTIVE EXAM: HCPCS | Performed by: INTERNAL MEDICINE

## 2023-06-07 PROCEDURE — 84443 ASSAY THYROID STIM HORMONE: CPT

## 2023-06-07 PROCEDURE — 85025 COMPLETE CBC W/AUTO DIFF WBC: CPT

## 2023-06-07 PROCEDURE — 3078F DIAST BP <80 MM HG: CPT | Performed by: INTERNAL MEDICINE

## 2023-06-07 PROCEDURE — 1160F RVW MEDS BY RX/DR IN RCRD: CPT | Performed by: INTERNAL MEDICINE

## 2023-06-07 PROCEDURE — 36415 COLL VENOUS BLD VENIPUNCTURE: CPT

## 2023-06-07 PROCEDURE — 84153 ASSAY OF PSA TOTAL: CPT

## 2023-06-07 PROCEDURE — 83036 HEMOGLOBIN GLYCOSYLATED A1C: CPT

## 2023-06-07 PROCEDURE — 80053 COMPREHEN METABOLIC PANEL: CPT

## 2023-06-07 PROCEDURE — G0446 INTENS BEHAVE THER CARDIO DX: HCPCS | Performed by: INTERNAL MEDICINE

## 2023-06-07 RX ORDER — GABAPENTIN 100 MG/1
100 CAPSULE ORAL 3 TIMES DAILY
Qty: 90 CAPSULE | Refills: 5 | Status: SHIPPED | OUTPATIENT
Start: 2023-06-07 | End: 2023-10-04 | Stop reason: DRUGHIGH

## 2023-06-07 RX ORDER — ASPIRIN 81 MG/1
81 TABLET ORAL DAILY
COMMUNITY

## 2023-06-07 RX ORDER — TRIAMCINOLONE ACETONIDE 1 MG/G
1 CREAM TOPICAL 2 TIMES DAILY
COMMUNITY
Start: 2023-04-07 | End: 2024-05-23 | Stop reason: WASHOUT

## 2023-06-07 ASSESSMENT — ENCOUNTER SYMPTOMS
LOSS OF SENSATION IN FEET: 0
OCCASIONAL FEELINGS OF UNSTEADINESS: 0
DEPRESSION: 0

## 2023-06-07 ASSESSMENT — PATIENT HEALTH QUESTIONNAIRE - PHQ9
2. FEELING DOWN, DEPRESSED OR HOPELESS: NOT AT ALL
1. LITTLE INTEREST OR PLEASURE IN DOING THINGS: NOT AT ALL
SUM OF ALL RESPONSES TO PHQ9 QUESTIONS 1 AND 2: 0

## 2023-06-07 ASSESSMENT — ACTIVITIES OF DAILY LIVING (ADL)
MANAGING_FINANCES: INDEPENDENT
GROCERY_SHOPPING: INDEPENDENT
DRESSING: INDEPENDENT
TAKING_MEDICATION: INDEPENDENT
BATHING: INDEPENDENT
DOING_HOUSEWORK: INDEPENDENT

## 2023-06-08 LAB
ESTIMATED AVERAGE GLUCOSE FOR HBA1C: 126 MG/DL
HEMOGLOBIN A1C/HEMOGLOBIN TOTAL IN BLOOD: 6 %

## 2023-10-03 NOTE — PROGRESS NOTES
Subjective   Patient ID: Vega Rowe is a 63 y.o. male who presents for his 4 month follow up multiple medical conditions       His fingers are feeling better and healing.  The gabapentin is not helping so he adds a Tylenol and Aleve daily     He no longer eats fried or greasy foods   He bakes his chicken. He eats a lot of subway     He has maintained smoking cessation     HEALTH:  PSA 4/16 2.9, 4/17, 4/18 , 5/19, 5/2020, 6/2021, 6/2022, 6/2023   Colon 10/18, 10/19 showed polyps and Q 3 - declines repeating   EKG 10/3/16 , 3/18, 12/2020, 7/2021, 6/2023  Ca cardiac score 5/19 was 2537.4   Urine 3/19 , 6/2020  Urine protein 3/19 -, 6/2020, 6/2021, 7/2022, 6/2023  FLU declines   TDAP ? 2014   Hep B 2006 x 3   Hep C 5/18 -  Prevnar never   Pneumovax never   Shingles declines  CVD vaccine declines   Ophth - Last seen in 2023 at [facility]. He has new Rx for glasses. No glaucoma or MD. He did not let them dilate his eyes.        Review of Systems  All systems negative except those listed in the HPI      Objective   Visit Vitals  /70 (BP Location: Left arm, Patient Position: Sitting)   Pulse 77   Temp 36 °C (96.8 °F) (Temporal)    Body mass index is 23.88 kg/m².     Physical Exam  Vitals reviewed.   Constitutional:       Appearance: Normal appearance.   HENT:      Head: Normocephalic.      Right Ear: Tympanic membrane, ear canal and external ear normal.      Left Ear: Tympanic membrane, ear canal and external ear normal.      Nose: Nose normal.      Mouth/Throat:      Pharynx: Oropharynx is clear.   Eyes:      Conjunctiva/sclera: Conjunctivae normal.   Cardiovascular:      Rate and Rhythm: Normal rate and regular rhythm.      Pulses: Normal pulses.      Heart sounds: Normal heart sounds.   Pulmonary:      Effort: Pulmonary effort is normal.      Breath sounds: Normal breath sounds.   Abdominal:      General: Bowel sounds are normal.      Palpations: Abdomen is soft.   Musculoskeletal:         General: Normal  range of motion.      Cervical back: Normal range of motion and neck supple.      Comments: the left forefinger is tender, gangrenous areas at the tip- right middle finger nail is  completely with gangrene on the tip. The nails are growing back on all the fingers except the right middle one    Skin:     General: Skin is warm.   Neurological:      General: No focal deficit present.      Mental Status: He is alert and oriented to person, place, and time.   Psychiatric:         Mood and Affect: Mood normal.         Behavior: Behavior normal.         Thought Content: Thought content normal.         Judgment: Judgment normal.       Assessment/Plan   Problem List Items Addressed This Visit       Diabetes mellitus (CMS/HCC)    Elevated PSA    Gangrene of finger (CMS/HCC)    Relevant Medications    gabapentin (Neurontin) 600 mg tablet    Ischemia of digits of hand    Relevant Medications    gabapentin (Neurontin) 600 mg tablet    Pain in both hands - Primary       Follow up completed  Reviewed his labs from 6/2023  Labs ordered     Necrosis to tips of digits: On exam: the left forefinger is tender, gangrenous areas at the tip- right middle finger nail is  completely with gangrene on the tip. The nails are growing back on all the fingers except the right middle one 10/2023. Improved significantly. The pain is still present. The gabapentin is not helping so he adds a Tylenol and Aleve daily. Reminded the patient he is to keep his hands out of anything that is cold.   He quit smoking.   Continue amlodipine 10 mg daily, ASA 81 mg daily and NTG catalina as directed     S/p revision of amputation and excisional debridement to left index finger and left thumb 2/15/2023 w/ Dr Fonseca  AAA screening 5/2023 negative for aneurysm   Carotid doppler 5/2023 showed < 50% stenosis bilaterally   PVR studies 4/2023 showed bilateral Monophasic waveforms noted left Radial and Ulnar arteries at wrist. Decreased PPG's noted in  digits one through five.    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 The tip of the middle finger on the right hand is still gangrenous, all the fingers are getting better with Nitro bid and Norvasc.   Increase gabapentin to 600 mg TID   Recommend he get [place] gloves for protection during winter months      He quit smoking after dealing with the necrosis to his finger tips   He has smoked >1 pk /day for over 35 yrs.   Encouraged continued cessation      His weight/ BMI is in normal range in office today, recommend he maintain   His weight is 186 with BMI at 23.88     BP in normal range in office. We will continue to monitor   EKG was sinus, first degree block, no LVH or strain pattern 6/2023  Recommend he monitor his BP at home and call with elevated readings      I have spent 15 min face to face with this patient discussing their cardiac risk and behavioral therapies of nutrition choices and exercise. We are trying to eliminate habits that are contributing to their cardiac risk.  We agreed on a plan of how they can reduce their current CV risk   The patient's  10 yr CV risk was estimated at  16.1% 11/2023     Elevated lipids: LDL 55 and HDL 71 on labs in 6/2023   Explained though his cholesterol levels are not high his Ca cardiac score is and he would benefit from a statin. He did not like the side effects with statin medication in the past and does not want to add another medication. Statin medication gave him nightmares.   Continue ASA 81 mg daily.    Ca cardiac score in 5/19 was 2537.4.   Stress test not covered and declines testing  He declines seeing a cardiologist.       Type II DM: HbA1c 6.0 on labs in 6/2023.   Diabetic Composite Score 3/5 IO 10/2023   Improved with weight loss. He has been off all medications for years.   He  had GI issues with metformin and declines taking again   Recommend he monitor his feet nightly for skin breakdown or ulcerations  Spot urine in 6/2023      GERD: Stable   He states certain foods upset his GI so he avoids these foods  Red sauces and greasy food gives him abdominal pain and diarrhea  He eats mostly baked chicken   He declines repeating EGD      Chronic Pancreatitis : He will continue to monitor closely with Dr Cooper   He is drinking 6 beers a day.    CT urogram 4/19 confirms chronic changes   CT of abdomen 6/2021 showed intra and extra hepatic biliary dilation with filling defect, chronic pancreatitis, no masses, mild wall thickening of the bladder   ERCP 10/2021 showed 1 stent in the common bile duct, 1 partially occluded stent from the biliary tree in major papilla. Multiple stones noted in the gallbladder body. Some low grade atypical cells noted. Bx has not been finalized 12/2021.  He saw Dr Cooper in 2/2022 and ERCP in 3/2022 showed Bx of ampulla notable for dysplasia, adenoma- like   S/p ERCP on 5/2/2022, Bx negative for high grade dysplasia and malignancy      Hematuria: PSA elevated but stable. Orders placed for repeat PSA in 1/2024.  Microscopy was negative   CT Urogram was + for enlarged prostate and some outlet obstruction   Urine 6/2021 showed moderate hematuria  PSA 6/2023 elevated at 4.10. He has is low risk for cancer.   He saw Dr Dsouza in the past   He will call my office if he notices any changes.      Vitamin D def:  Continue OTC Vitamin D 2000 UT daily      Multiple moles on his back: Nothing suspicious on exam 10/2023  We will continue to monitor.   Recommend derm yearly for skin checks, he does not want to see derm      Colonoscopy in 10/19 showed polyps only and Q 3 - declines repeating    He will consider repeating the colonoscopy and EGD in 2024     Ophth:    Last seen in 2023 at [facility]. He has new Rx for glasses. No glaucoma or MD. He did not let them dilate his eyes.    Recommend he bring a copy of his last eye exam in office to have scanned in his chart      He will have his daughter as his MPOA. I provided the forms for patient to complete and have notarized 6/2023. Recommend he bring a copy of his Advanced Directives in office to have scanned in his chart 6/2023     FLU declines   TDAP ?2014   Hep B 2006 x 3   Hep C 5/18 -  Prevnar never   Pneumovax never   Shingles declines  CVD vaccine declines     Some elements in the chart were copied from Dr. Sena's last office visit with patient.   Notes have been updated where appropriate, and reflect my current medical decision making from today.     RTC in 6 months for MCR or sooner if needed      Scribe Attestation  By signing my name below, I, Nathalia Powers , Scribe   attest that this documentation has been prepared under the direction and in the presence of Kathleen Sena MD.

## 2023-10-04 ENCOUNTER — OFFICE VISIT (OUTPATIENT)
Dept: PRIMARY CARE | Facility: CLINIC | Age: 63
End: 2023-10-04
Payer: MEDICARE

## 2023-10-04 VITALS
TEMPERATURE: 96.8 F | SYSTOLIC BLOOD PRESSURE: 122 MMHG | HEART RATE: 77 BPM | WEIGHT: 186 LBS | OXYGEN SATURATION: 97 % | BODY MASS INDEX: 23.87 KG/M2 | HEIGHT: 74 IN | DIASTOLIC BLOOD PRESSURE: 70 MMHG

## 2023-10-04 DIAGNOSIS — K83.1 BILIARY STRICTURE (CMS-HCC): ICD-10-CM

## 2023-10-04 DIAGNOSIS — M79.642 PAIN IN BOTH HANDS: ICD-10-CM

## 2023-10-04 DIAGNOSIS — M79.641 PAIN IN BOTH HANDS: ICD-10-CM

## 2023-10-04 DIAGNOSIS — N02.B9 BERGER'S DISEASE: Primary | ICD-10-CM

## 2023-10-04 DIAGNOSIS — E08.40 DIABETES MELLITUS DUE TO UNDERLYING CONDITION WITH DIABETIC NEUROPATHY, WITHOUT LONG-TERM CURRENT USE OF INSULIN (MULTI): ICD-10-CM

## 2023-10-04 DIAGNOSIS — I99.8 ISCHEMIA OF DIGITS OF HAND: ICD-10-CM

## 2023-10-04 DIAGNOSIS — R93.3 ABNORMAL FINDINGS ON DIAGNOSTIC IMAGING OF OTHER PARTS OF DIGESTIVE TRACT: ICD-10-CM

## 2023-10-04 DIAGNOSIS — I96: ICD-10-CM

## 2023-10-04 DIAGNOSIS — R97.20 ELEVATED PSA: ICD-10-CM

## 2023-10-04 DIAGNOSIS — D12.6 SERRATED ADENOMA OF COLON: ICD-10-CM

## 2023-10-04 DIAGNOSIS — E11.9 TYPE 2 DIABETES MELLITUS WITHOUT COMPLICATION, WITHOUT LONG-TERM CURRENT USE OF INSULIN (MULTI): ICD-10-CM

## 2023-10-04 PROBLEM — K61.1 PERIRECTAL ABSCESS: Status: RESOLVED | Noted: 2023-04-02 | Resolved: 2023-10-04

## 2023-10-04 PROBLEM — L72.3 SEBACEOUS CYST: Status: RESOLVED | Noted: 2023-04-02 | Resolved: 2023-10-04

## 2023-10-04 PROBLEM — F17.210 TOBACCO DEPENDENCE DUE TO CIGARETTES: Status: RESOLVED | Noted: 2023-04-02 | Resolved: 2023-10-04

## 2023-10-04 PROCEDURE — 1160F RVW MEDS BY RX/DR IN RCRD: CPT | Performed by: INTERNAL MEDICINE

## 2023-10-04 PROCEDURE — 3078F DIAST BP <80 MM HG: CPT | Performed by: INTERNAL MEDICINE

## 2023-10-04 PROCEDURE — 3074F SYST BP LT 130 MM HG: CPT | Performed by: INTERNAL MEDICINE

## 2023-10-04 PROCEDURE — 3044F HG A1C LEVEL LT 7.0%: CPT | Performed by: INTERNAL MEDICINE

## 2023-10-04 PROCEDURE — 99214 OFFICE O/P EST MOD 30 MIN: CPT | Performed by: INTERNAL MEDICINE

## 2023-10-04 PROCEDURE — 1159F MED LIST DOCD IN RCRD: CPT | Performed by: INTERNAL MEDICINE

## 2023-10-04 RX ORDER — GABAPENTIN 600 MG/1
600 TABLET ORAL 3 TIMES DAILY
Qty: 90 TABLET | Refills: 5 | Status: SHIPPED | OUTPATIENT
Start: 2023-10-04 | End: 2024-01-13 | Stop reason: SDUPTHER

## 2023-10-04 RX ORDER — GABAPENTIN 100 MG/1
300 CAPSULE ORAL 3 TIMES DAILY
Qty: 270 CAPSULE | Refills: 5 | Status: SHIPPED | OUTPATIENT
Start: 2023-10-04 | End: 2023-10-04

## 2023-10-04 ASSESSMENT — PATIENT HEALTH QUESTIONNAIRE - PHQ9
1. LITTLE INTEREST OR PLEASURE IN DOING THINGS: NOT AT ALL
SUM OF ALL RESPONSES TO PHQ9 QUESTIONS 1 AND 2: 0
2. FEELING DOWN, DEPRESSED OR HOPELESS: NOT AT ALL

## 2023-10-27 DIAGNOSIS — I99.8 ISCHEMIA OF DIGITS OF HAND: Primary | ICD-10-CM

## 2023-10-30 RX ORDER — NITROGLYCERIN 20 MG/G
0.5 OINTMENT TOPICAL EVERY 6 HOURS SCHEDULED
Qty: 30 G | Refills: 0 | Status: SHIPPED | OUTPATIENT
Start: 2023-10-30 | End: 2024-03-28

## 2024-01-13 DIAGNOSIS — L03.019 CELLULITIS OF FINGER, UNSPECIFIED LATERALITY: Primary | ICD-10-CM

## 2024-01-13 DIAGNOSIS — I99.8 ISCHEMIA OF DIGITS OF HAND: ICD-10-CM

## 2024-01-13 DIAGNOSIS — I96: ICD-10-CM

## 2024-01-13 RX ORDER — CEPHALEXIN 500 MG/1
500 CAPSULE ORAL 3 TIMES DAILY
Qty: 30 CAPSULE | Refills: 0 | Status: SHIPPED | OUTPATIENT
Start: 2024-01-13 | End: 2024-01-23

## 2024-01-13 RX ORDER — GABAPENTIN 600 MG/1
600 TABLET ORAL 3 TIMES DAILY
Qty: 90 TABLET | Refills: 5 | Status: SHIPPED | OUTPATIENT
Start: 2024-01-13 | End: 2024-07-11

## 2024-02-10 DIAGNOSIS — N02.B9 BERGER'S DISEASE: ICD-10-CM

## 2024-02-10 DIAGNOSIS — I96: Primary | ICD-10-CM

## 2024-02-10 RX ORDER — AMLODIPINE BESYLATE 10 MG/1
10 TABLET ORAL DAILY
Qty: 90 TABLET | Refills: 3 | Status: SHIPPED | OUTPATIENT
Start: 2024-02-10 | End: 2025-02-09

## 2024-02-11 ENCOUNTER — APPOINTMENT (OUTPATIENT)
Dept: CARDIOLOGY | Facility: HOSPITAL | Age: 64
End: 2024-02-11
Payer: MEDICARE

## 2024-02-11 ENCOUNTER — HOSPITAL ENCOUNTER (EMERGENCY)
Facility: HOSPITAL | Age: 64
Discharge: HOME | End: 2024-02-11
Attending: STUDENT IN AN ORGANIZED HEALTH CARE EDUCATION/TRAINING PROGRAM
Payer: MEDICARE

## 2024-02-11 VITALS
OXYGEN SATURATION: 95 % | HEART RATE: 59 BPM | HEIGHT: 74 IN | TEMPERATURE: 97.5 F | WEIGHT: 180 LBS | DIASTOLIC BLOOD PRESSURE: 74 MMHG | SYSTOLIC BLOOD PRESSURE: 130 MMHG | BODY MASS INDEX: 23.1 KG/M2 | RESPIRATION RATE: 20 BRPM

## 2024-02-11 DIAGNOSIS — R55 NEAR SYNCOPE: Primary | ICD-10-CM

## 2024-02-11 DIAGNOSIS — U07.1 COVID: ICD-10-CM

## 2024-02-11 LAB
ANION GAP SERPL CALC-SCNC: 13 MMOL/L (ref 10–20)
BASOPHILS # BLD AUTO: 0.02 X10*3/UL (ref 0–0.1)
BASOPHILS NFR BLD AUTO: 0.4 %
BUN SERPL-MCNC: 14 MG/DL (ref 6–23)
CALCIUM SERPL-MCNC: 9 MG/DL (ref 8.6–10.3)
CARDIAC TROPONIN I PNL SERPL HS: 5 NG/L (ref 0–20)
CHLORIDE SERPL-SCNC: 104 MMOL/L (ref 98–107)
CO2 SERPL-SCNC: 22 MMOL/L (ref 21–32)
CREAT SERPL-MCNC: 0.89 MG/DL (ref 0.5–1.3)
EGFRCR SERPLBLD CKD-EPI 2021: >90 ML/MIN/1.73M*2
EOSINOPHIL # BLD AUTO: 0.22 X10*3/UL (ref 0–0.7)
EOSINOPHIL NFR BLD AUTO: 4.1 %
ERYTHROCYTE [DISTWIDTH] IN BLOOD BY AUTOMATED COUNT: 13.2 % (ref 11.5–14.5)
ETHANOL SERPL-MCNC: <10 MG/DL
FLUAV RNA RESP QL NAA+PROBE: NOT DETECTED
FLUBV RNA RESP QL NAA+PROBE: NOT DETECTED
GLUCOSE SERPL-MCNC: 257 MG/DL (ref 74–99)
HCT VFR BLD AUTO: 33.2 % (ref 41–52)
HGB BLD-MCNC: 11.1 G/DL (ref 13.5–17.5)
IMM GRANULOCYTES # BLD AUTO: 0.01 X10*3/UL (ref 0–0.7)
IMM GRANULOCYTES NFR BLD AUTO: 0.2 % (ref 0–0.9)
LYMPHOCYTES # BLD AUTO: 1.45 X10*3/UL (ref 1.2–4.8)
LYMPHOCYTES NFR BLD AUTO: 27.3 %
MAGNESIUM SERPL-MCNC: 1.67 MG/DL (ref 1.6–2.4)
MCH RBC QN AUTO: 33.4 PG (ref 26–34)
MCHC RBC AUTO-ENTMCNC: 33.4 G/DL (ref 32–36)
MCV RBC AUTO: 100 FL (ref 80–100)
MONOCYTES # BLD AUTO: 0.44 X10*3/UL (ref 0.1–1)
MONOCYTES NFR BLD AUTO: 8.3 %
NEUTROPHILS # BLD AUTO: 3.17 X10*3/UL (ref 1.2–7.7)
NEUTROPHILS NFR BLD AUTO: 59.7 %
NRBC BLD-RTO: 0 /100 WBCS (ref 0–0)
PLATELET # BLD AUTO: 189 X10*3/UL (ref 150–450)
POTASSIUM SERPL-SCNC: 3.6 MMOL/L (ref 3.5–5.3)
RBC # BLD AUTO: 3.32 X10*6/UL (ref 4.5–5.9)
SARS-COV-2 RNA RESP QL NAA+PROBE: DETECTED
SODIUM SERPL-SCNC: 135 MMOL/L (ref 136–145)
WBC # BLD AUTO: 5.3 X10*3/UL (ref 4.4–11.3)

## 2024-02-11 PROCEDURE — 85025 COMPLETE CBC W/AUTO DIFF WBC: CPT | Performed by: STUDENT IN AN ORGANIZED HEALTH CARE EDUCATION/TRAINING PROGRAM

## 2024-02-11 PROCEDURE — 82077 ASSAY SPEC XCP UR&BREATH IA: CPT | Performed by: STUDENT IN AN ORGANIZED HEALTH CARE EDUCATION/TRAINING PROGRAM

## 2024-02-11 PROCEDURE — 99284 EMERGENCY DEPT VISIT MOD MDM: CPT | Mod: 25 | Performed by: STUDENT IN AN ORGANIZED HEALTH CARE EDUCATION/TRAINING PROGRAM

## 2024-02-11 PROCEDURE — 84484 ASSAY OF TROPONIN QUANT: CPT | Performed by: STUDENT IN AN ORGANIZED HEALTH CARE EDUCATION/TRAINING PROGRAM

## 2024-02-11 PROCEDURE — 87636 SARSCOV2 & INF A&B AMP PRB: CPT | Performed by: STUDENT IN AN ORGANIZED HEALTH CARE EDUCATION/TRAINING PROGRAM

## 2024-02-11 PROCEDURE — 83735 ASSAY OF MAGNESIUM: CPT | Performed by: STUDENT IN AN ORGANIZED HEALTH CARE EDUCATION/TRAINING PROGRAM

## 2024-02-11 PROCEDURE — 36415 COLL VENOUS BLD VENIPUNCTURE: CPT | Performed by: STUDENT IN AN ORGANIZED HEALTH CARE EDUCATION/TRAINING PROGRAM

## 2024-02-11 PROCEDURE — 93005 ELECTROCARDIOGRAM TRACING: CPT

## 2024-02-11 PROCEDURE — 80048 BASIC METABOLIC PNL TOTAL CA: CPT | Performed by: STUDENT IN AN ORGANIZED HEALTH CARE EDUCATION/TRAINING PROGRAM

## 2024-02-11 ASSESSMENT — PAIN SCALES - GENERAL: PAINLEVEL_OUTOF10: 0 - NO PAIN

## 2024-02-11 ASSESSMENT — COLUMBIA-SUICIDE SEVERITY RATING SCALE - C-SSRS
1. IN THE PAST MONTH, HAVE YOU WISHED YOU WERE DEAD OR WISHED YOU COULD GO TO SLEEP AND NOT WAKE UP?: NO
6. HAVE YOU EVER DONE ANYTHING, STARTED TO DO ANYTHING, OR PREPARED TO DO ANYTHING TO END YOUR LIFE?: NO
2. HAVE YOU ACTUALLY HAD ANY THOUGHTS OF KILLING YOURSELF?: NO

## 2024-02-11 ASSESSMENT — LIFESTYLE VARIABLES
EVER FELT BAD OR GUILTY ABOUT YOUR DRINKING: NO
HAVE PEOPLE ANNOYED YOU BY CRITICIZING YOUR DRINKING: NO
HAVE YOU EVER FELT YOU SHOULD CUT DOWN ON YOUR DRINKING: NO
EVER HAD A DRINK FIRST THING IN THE MORNING TO STEADY YOUR NERVES TO GET RID OF A HANGOVER: NO

## 2024-02-11 ASSESSMENT — PAIN - FUNCTIONAL ASSESSMENT: PAIN_FUNCTIONAL_ASSESSMENT: 0-10

## 2024-02-11 NOTE — ED TRIAGE NOTES
Patient presents to ED via [facility] for syncopal episode at bar. Patient states he was there about 20 minutes and had syncopal episode but did not have LOC. Patient states he only had 1 drink. Patient states he did not fall. Upon EMS arrival patient was sitting on ground. When they got him up off floor patient had another syncopal episode and did have LOC. Patient was hypotensive for EMS. Patient type 2 diabetic. Patient states he hasn't been feeling the best lately. No acute distress noted at this time.

## 2024-02-11 NOTE — ED PROVIDER NOTES
"HPI   Chief Complaint   Patient presents with    Syncope    Flu Symptoms    Dizziness       Presents with an episode of near syncope/lightheadedness.  Patient states that he thinks he has had the flu for the past week or so, and states that he has been feeling better.  States that he walked to the bar or drink earlier today without difficulty.  While sitting on a barstool after a \"couple sips of beer\" he noticed an episode of lightheadedness with associated diaphoresis.  He states that he subsequently laid on the ground.  Symptoms resolved without any acute intervention.  He did not fall, lose consciousness, or strike his head.  Denies headache, vision changes, difficulty talking, difficulty swallowing, nausea, focal numbness/weakness, chest pain, shortness of breath, and abdominal pain.  No known recent sick contacts.      History provided by:  Patient and EMS personnel   used: No                        [place] Coma Scale Score: 15         NIH Stroke Scale: 0             Patient History   Past Medical History:   Diagnosis Date    Abnormal weight loss 12/08/2021    Weight loss, unintentional    Encounter for screening for cardiovascular disorders     Encounter for screening for cardiovascular disorders    Other specified abnormal findings of blood chemistry 12/08/2021    Elevated liver function tests    Personal history of other diseases of the musculoskeletal system and connective tissue     History of backache    Personal history of other malignant neoplasm of skin     History of basal cell carcinoma    Personal history of other mental and behavioral disorders 10/01/2016    History of depression    Personal history of other specified conditions     History of abdominal pain     Past Surgical History:   Procedure Laterality Date    LUMBAR DISCECTOMY  12/10/2012    Spinal Diskectomy    OTHER SURGICAL HISTORY  12/10/2012    Destruction Of Malignant Lesion    OTHER SURGICAL HISTORY  12/10/2012    " Dermal Autograft    OTHER SURGICAL HISTORY  12/10/2012    Acellular Dermal Allograft Face     Family History   Problem Relation Name Age of Onset    Breast cancer Mother  49    Aneurysm Father          of abdominal aorta    Asthma Sister       Social History     Tobacco Use    Smoking status: Former     Packs/day: 1.00     Years: 50.00     Additional pack years: 0.00     Total pack years: 50.00     Types: Cigarettes     Quit date: 2023     Years since quittin.8    Smokeless tobacco: Never   Vaping Use    Vaping Use: Never used   Substance Use Topics    Alcohol use: Yes     Alcohol/week: 10.0 standard drinks of alcohol     Types: 10 Cans of beer per week     Comment: occasional    Drug use: Never       Physical Exam   ED Triage Vitals [24 1316]   Temperature Heart Rate Respirations BP   36.4 °C (97.5 °F) 56 16 136/74      Pulse Ox Temp src Heart Rate Source Patient Position   96 % -- -- --      BP Location FiO2 (%)     -- --       Physical Exam  Vitals and nursing note reviewed.   HENT:      Head: Atraumatic.      Mouth/Throat:      Mouth: Mucous membranes are moist.   Eyes:      Conjunctiva/sclera: Conjunctivae normal.   Cardiovascular:      Rate and Rhythm: Normal rate and regular rhythm.      Pulses: Normal pulses.      Comments: No obvious systolic ejection murmur noted at the right upper sternal border.  There is no pitting lower extremity edema or erythema.  Pulmonary:      Effort: Pulmonary effort is normal.      Breath sounds: Normal breath sounds.   Abdominal:      Palpations: Abdomen is soft.      Tenderness: There is no abdominal tenderness.   Musculoskeletal:         General: No deformity.      Cervical back: Normal range of motion.   Skin:     General: Skin is warm and dry.   Neurological:      Mental Status: He is alert.      Comments: Moving all extremities         ED Course & [facility]   ED Course as of 24 1459   Sun 2024   1325 2019 CT of the chest:  IMPRESSION:  1.  Coronary artery calcium score of 2537.4*. [AB]   1325 Per chart review, no prior echocardiogram. [AB]   1343 ECG 12 lead  My ECG interpretation: Normal sinus rhythm, first-degree AV block, no ST segment ovation, QTc 405.  Low QRS voltage, this has been noticed on several prior ECGs. [AB]   1347 HEMOGLOBIN(!): 11.1  12.4 on 6/7/23 [AB]   1424 Coronavirus 2019, PCR(!): Detected  Suspect this is why the patient is felt unwell recently.  States he has not felt well since at least Monday.  Not a candidate for Paxlovid. [AB]   1453 Discussed disposition from the emergency department.  Patient currently feels asymptomatic.  We did consider/I did offer escalation of care to hospitalization for echocardiogram, possible cardiology consultation, etc. for syncope workup.  Patient states that he currently feels like he is at his baseline and would rather follow-up as an outpatient, which I do not think is unreasonable.  Given his recent illness as well as being in a bar/crowded place, he certainly could have had a vasovagal episode in the setting of likely dehydration from his viral illness.  Suspicion for cardiac syncope is lower at this time.  His [place] syncope risk score is low risk. [AB]      ED Course User Index  [AB] Macario Vallecillo MD         Diagnoses as of 02/11/24 1459   Near syncope   COVID       Medical Decision Making  History of Buerger's disease presents with an episode of lightheadedness/near syncope that currently feels at his baseline.  Diagnostics reassuring.  Will ultimately discharge home to follow-up with his outpatient physician.  Discussed return precautions.  Further details noted in the ED course above.    Amount and/or Complexity of Data Reviewed  External Data Reviewed: notes.     Details: Prior CT chest  Labs: ordered.  ECG/medicine tests: ordered and independent interpretation performed. Decision-making details documented in ED Course.        Procedure  Procedures     Macario Vallecillo,  MD  02/11/24 1459

## 2024-02-11 NOTE — DISCHARGE INSTRUCTIONS
As we discussed in the emergency department, you were found to have COVID.  This is likely why you have felt unwell recently.  We also discussed how you almost passed out today.  This could be secondary to dehydration from your recent infection.  This could also be secondary to something called vasovagal syncope, as you are in a crowded area at the time of this occurring.  Please follow-up with your primary care physician.  Please return to the emergency department, if you have any concerns.

## 2024-02-12 LAB
ATRIAL RATE: 51 BPM
P AXIS: 22 DEGREES
PR INTERVAL: 296 MS
Q ONSET: 205 MS
QRS COUNT: 8 BEATS
QRS DURATION: 106 MS
QT INTERVAL: 440 MS
QTC CALCULATION(BAZETT): 405 MS
QTC FREDERICIA: 417 MS
R AXIS: -48 DEGREES
T AXIS: 44 DEGREES
T OFFSET: 425 MS
VENTRICULAR RATE: 51 BPM

## 2024-04-09 ENCOUNTER — APPOINTMENT (OUTPATIENT)
Dept: PRIMARY CARE | Facility: CLINIC | Age: 64
End: 2024-04-09
Payer: MEDICARE

## 2024-04-16 ENCOUNTER — OFFICE VISIT (OUTPATIENT)
Dept: CARDIOLOGY | Facility: CLINIC | Age: 64
End: 2024-04-16
Payer: MEDICARE

## 2024-04-16 VITALS
BODY MASS INDEX: 24.37 KG/M2 | HEIGHT: 75 IN | SYSTOLIC BLOOD PRESSURE: 120 MMHG | WEIGHT: 196 LBS | HEART RATE: 60 BPM | DIASTOLIC BLOOD PRESSURE: 70 MMHG

## 2024-04-16 DIAGNOSIS — R94.31 ABNORMAL ELECTROCARDIOGRAM (ECG) (EKG): ICD-10-CM

## 2024-04-16 DIAGNOSIS — R93.1 HIGH CORONARY ARTERY CALCIUM SCORE: Primary | ICD-10-CM

## 2024-04-16 DIAGNOSIS — I77.810 DILATED AORTIC ROOT (CMS-HCC): ICD-10-CM

## 2024-04-16 DIAGNOSIS — I73.1 BUERGER'S DISEASE (CMS-HCC): ICD-10-CM

## 2024-04-16 DIAGNOSIS — R00.1 SINUS BRADYCARDIA: ICD-10-CM

## 2024-04-16 DIAGNOSIS — I99.8 ISCHEMIA OF DIGITS OF HAND: ICD-10-CM

## 2024-04-16 DIAGNOSIS — R42 DIZZINESS: ICD-10-CM

## 2024-04-16 DIAGNOSIS — R42 LIGHTHEADEDNESS: ICD-10-CM

## 2024-04-16 DIAGNOSIS — I45.10 RBBB: ICD-10-CM

## 2024-04-16 PROCEDURE — 1159F MED LIST DOCD IN RCRD: CPT | Performed by: INTERNAL MEDICINE

## 2024-04-16 PROCEDURE — 3074F SYST BP LT 130 MM HG: CPT | Performed by: INTERNAL MEDICINE

## 2024-04-16 PROCEDURE — 99213 OFFICE O/P EST LOW 20 MIN: CPT | Performed by: INTERNAL MEDICINE

## 2024-04-16 PROCEDURE — 3078F DIAST BP <80 MM HG: CPT | Performed by: INTERNAL MEDICINE

## 2024-04-16 PROCEDURE — 1160F RVW MEDS BY RX/DR IN RCRD: CPT | Performed by: INTERNAL MEDICINE

## 2024-04-16 NOTE — PROGRESS NOTES
"  Subjective  Vega Rowe  is a 63 y.o. year old male who presents for elevated coronary calcium score.  And episode of lightheadedness with near syncope 2/11/24 at which time had was found to have COVID.  He has H/O Buerger's disease with digital ulcers.  He quit smoking several months ago.  No chest pain, no palpaitions, no dyspnea.  He has longstanding sinus bradycardia    Blood pressure 120/70, pulse 60, height 1.905 m (6' 3\"), weight 88.9 kg (196 lb).   Patient has no known allergies.  Past Medical History:   Diagnosis Date    Abnormal weight loss 12/08/2021    Weight loss, unintentional    Encounter for screening for cardiovascular disorders     Encounter for screening for cardiovascular disorders    Other specified abnormal findings of blood chemistry 12/08/2021    Elevated liver function tests    Personal history of other diseases of the musculoskeletal system and connective tissue     History of backache    Personal history of other malignant neoplasm of skin     History of basal cell carcinoma    Personal history of other mental and behavioral disorders 10/01/2016    History of depression    Personal history of other specified conditions     History of abdominal pain     Past Surgical History:   Procedure Laterality Date    LUMBAR DISCECTOMY  12/10/2012    Spinal Diskectomy    OTHER SURGICAL HISTORY  12/10/2012    Destruction Of Malignant Lesion    OTHER SURGICAL HISTORY  12/10/2012    Dermal Autograft    OTHER SURGICAL HISTORY  12/10/2012    Acellular Dermal Allograft Face     Family History   Problem Relation Name Age of Onset    Breast cancer Mother  49    Aneurysm Father          of abdominal aorta    Asthma Sister       @SOC    Current Outpatient Medications   Medication Sig Dispense Refill    amLODIPine (Norvasc) 10 mg tablet Take 1 tablet (10 mg) by mouth once daily. 90 tablet 3    aspirin 81 mg EC tablet Take 1 tablet (81 mg) by mouth once daily.      gabapentin (Neurontin) 600 mg tablet Take 1 " tablet (600 mg) by mouth 3 times a day. 90 tablet 5    ibuprofen 600 mg tablet Take 1 tablet (600 mg) by mouth 3 times a day.      mupirocin (Bactroban) 2 % ointment Apply a small amount 3 times daily as directed      nitroglycerin (Nitro-Bid) 2 % ointment Place 0.5 inches on the skin every 6 hours. 30 g 0    triamcinolone (Kenalog) 0.1 % cream Apply 1 Application topically 2 times a day.       No current facility-administered medications for this visit.        ROS  Review of Systems   All other systems reviewed and are negative.      Physical Exam  Physical Exam  Constitutional:       Appearance: Normal appearance.   HENT:      Head: Normocephalic and atraumatic.   Cardiovascular:      Rate and Rhythm: Normal rate and regular rhythm.   Pulmonary:      Comments: Decreased breath sounds throughout  Abdominal:      General: Abdomen is flat.   Musculoskeletal:      Right lower leg: No edema.      Left lower leg: No edema.      Comments: Digital ulcers noted   Skin:     General: Skin is warm and dry.   Neurological:      General: No focal deficit present.      Mental Status: He is alert and oriented to person, place, and time.   Psychiatric:         Mood and Affect: Mood normal.         Behavior: Behavior normal.          EKG  Encounter Date: 02/11/24   ECG 12 lead   Result Value    Ventricular Rate 51    Atrial Rate 51    NE Interval 296    QRS Duration 106    QT Interval 440    QTC Calculation(Bazett) 405    P Axis 22    R Axis -48    T Axis 44    QRS Count 8    Q Onset 205    T Offset 425    QTC Fredericia 417    Narrative    Sinus bradycardia with 1st degree AV block  Low voltage QRS  Incomplete right bundle branch block  Left anterior fascicular block  Abnormal ECG  No previous ECGs available  See ED provider note for full interpretation and clinical correlation  Confirmed by Kaylie Rich ([id number]) on 2/12/2024 4:40:07 PM       Problem List Items Addressed This Visit       High coronary artery calcium score -  Primary     5/14/19 coronary calcium score LM66. , Cx 767, , Total = 2537         Relevant Orders    Nuclear Stress Test    Follow Up In Cardiology    Ischemia of digits of hand    Relevant Orders    Follow Up In Cardiology    Abnormal electrocardiogram (ECG) (EKG)    Relevant Orders    Transthoracic echo (TTE) complete    Nuclear Stress Test    Follow Up In Cardiology    Dilated aortic root (CMS-HCC)     Mid and descending thoraci aorta dilated on CT chest of 5/24/19         Relevant Orders    Transthoracic echo (TTE) complete    Follow Up In Cardiology    Sinus bradycardia    Relevant Orders    Holter or Event Cardiac Monitor    Follow Up In Cardiology    Lightheadedness    RBBB     2/11/24 EKG sinus bradycardia at 51/min., IRBBB+ LAD         Buerger's disease (CMS-HCC)     Echocardiogram, 48 Hour Holter, stress thallium  Refer to Dr. Coni Prince MD

## 2024-04-17 ENCOUNTER — HOSPITAL ENCOUNTER (OUTPATIENT)
Dept: CARDIOLOGY | Facility: CLINIC | Age: 64
Discharge: HOME | End: 2024-04-17
Payer: MEDICARE

## 2024-04-17 DIAGNOSIS — R00.1 SINUS BRADYCARDIA: ICD-10-CM

## 2024-04-17 DIAGNOSIS — R42 DIZZINESS: ICD-10-CM

## 2024-04-17 PROCEDURE — 93225 XTRNL ECG REC<48 HRS REC: CPT

## 2024-04-17 PROCEDURE — 93227 XTRNL ECG REC<48 HR R&I: CPT | Performed by: INTERNAL MEDICINE

## 2024-04-19 PROBLEM — Z85.828 HISTORY OF BASAL CELL CARCINOMA (BCC): Status: ACTIVE | Noted: 2024-04-19

## 2024-04-19 PROBLEM — I73.1: Status: ACTIVE | Noted: 2024-04-19

## 2024-04-19 PROBLEM — N02.B9 PRIMARY IGA NEPHROPATHY: Status: ACTIVE | Noted: 2024-04-19

## 2024-04-19 PROBLEM — E78.5 HYPERLIPIDEMIA: Status: ACTIVE | Noted: 2022-05-02

## 2024-05-06 ENCOUNTER — APPOINTMENT (OUTPATIENT)
Dept: CARDIOLOGY | Facility: CLINIC | Age: 64
End: 2024-05-06
Payer: MEDICARE

## 2024-05-06 ENCOUNTER — APPOINTMENT (OUTPATIENT)
Dept: RADIOLOGY | Facility: CLINIC | Age: 64
End: 2024-05-06
Payer: MEDICARE

## 2024-05-09 ENCOUNTER — OFFICE VISIT (OUTPATIENT)
Dept: CARDIOLOGY | Facility: CLINIC | Age: 64
End: 2024-05-09
Payer: MEDICARE

## 2024-05-09 VITALS
BODY MASS INDEX: 24.37 KG/M2 | HEART RATE: 60 BPM | DIASTOLIC BLOOD PRESSURE: 60 MMHG | WEIGHT: 196 LBS | HEIGHT: 75 IN | SYSTOLIC BLOOD PRESSURE: 122 MMHG

## 2024-05-09 DIAGNOSIS — I73.1 BUERGER'S DISEASE (CMS-HCC): ICD-10-CM

## 2024-05-09 DIAGNOSIS — I77.810 DILATED AORTIC ROOT (CMS-HCC): ICD-10-CM

## 2024-05-09 DIAGNOSIS — R93.1 HIGH CORONARY ARTERY CALCIUM SCORE: ICD-10-CM

## 2024-05-09 DIAGNOSIS — R00.1 SINUS BRADYCARDIA: ICD-10-CM

## 2024-05-09 DIAGNOSIS — R42 DIZZINESS: ICD-10-CM

## 2024-05-09 DIAGNOSIS — I45.10 RBBB: Primary | ICD-10-CM

## 2024-05-09 DIAGNOSIS — R94.31 ABNORMAL ELECTROCARDIOGRAM (ECG) (EKG): ICD-10-CM

## 2024-05-09 DIAGNOSIS — I99.8 ISCHEMIA OF DIGITS OF HAND: ICD-10-CM

## 2024-05-09 PROCEDURE — 1160F RVW MEDS BY RX/DR IN RCRD: CPT | Performed by: INTERNAL MEDICINE

## 2024-05-09 PROCEDURE — 1036F TOBACCO NON-USER: CPT | Performed by: INTERNAL MEDICINE

## 2024-05-09 PROCEDURE — 99214 OFFICE O/P EST MOD 30 MIN: CPT | Performed by: INTERNAL MEDICINE

## 2024-05-09 PROCEDURE — 1159F MED LIST DOCD IN RCRD: CPT | Performed by: INTERNAL MEDICINE

## 2024-05-09 PROCEDURE — 3078F DIAST BP <80 MM HG: CPT | Performed by: INTERNAL MEDICINE

## 2024-05-09 PROCEDURE — 3074F SYST BP LT 130 MM HG: CPT | Performed by: INTERNAL MEDICINE

## 2024-05-09 NOTE — PROGRESS NOTES
"  Subjective  Vega Rowe  is a 64 y.o. year old male who presents for F/U HM.  He cancelled his stress test and echocardiogram until he sees Dr. Newberry about his digits on 5 24/24 No chest pain, no dyspnea, no palpaitions    Blood pressure 122/60, pulse 60, height 1.905 m (6' 3\"), weight 88.9 kg (196 lb).   Patient has no known allergies.  Past Medical History:   Diagnosis Date    Abnormal weight loss 12/08/2021    Weight loss, unintentional    Encounter for screening for cardiovascular disorders     Encounter for screening for cardiovascular disorders    Other specified abnormal findings of blood chemistry 12/08/2021    Elevated liver function tests    Personal history of other diseases of the musculoskeletal system and connective tissue     History of backache    Personal history of other malignant neoplasm of skin     History of basal cell carcinoma    Personal history of other mental and behavioral disorders 10/01/2016    History of depression    Personal history of other specified conditions     History of abdominal pain     Past Surgical History:   Procedure Laterality Date    LUMBAR DISCECTOMY  12/10/2012    Spinal Diskectomy    OTHER SURGICAL HISTORY  12/10/2012    Destruction Of Malignant Lesion    OTHER SURGICAL HISTORY  12/10/2012    Dermal Autograft    OTHER SURGICAL HISTORY  12/10/2012    Acellular Dermal Allograft Face     Family History   Problem Relation Name Age of Onset    Breast cancer Mother  49    Aneurysm Father          of abdominal aorta    Asthma Sister       @SOC    Current Outpatient Medications   Medication Sig Dispense Refill    amLODIPine (Norvasc) 10 mg tablet Take 1 tablet (10 mg) by mouth once daily. 90 tablet 3    aspirin 81 mg EC tablet Take 1 tablet (81 mg) by mouth once daily.      gabapentin (Neurontin) 600 mg tablet Take 1 tablet (600 mg) by mouth 3 times a day. 90 tablet 5    ibuprofen 600 mg tablet Take 1 tablet (600 mg) by mouth 3 times a day.      mupirocin (Bactroban) 2 % " ointment Apply a small amount 3 times daily as directed      nitroglycerin (Nitro-Bid) 2 % ointment Place 0.5 inches on the skin every 6 hours. 30 g 0    triamcinolone (Kenalog) 0.1 % cream Apply 1 Application topically 2 times a day.       No current facility-administered medications for this visit.        ROS  Review of Systems   All other systems reviewed and are negative.      Physical Exam  Physical Exam  Constitutional:       Appearance: Normal appearance.   Cardiovascular:      Rate and Rhythm: Normal rate and regular rhythm.   Pulmonary:      Effort: Pulmonary effort is normal.      Breath sounds: Normal breath sounds.   Musculoskeletal:      Right lower leg: No edema.      Left lower leg: No edema.      Comments: Digital ulcers   Skin:     General: Skin is warm and dry.   Neurological:      General: No focal deficit present.      Mental Status: He is alert and oriented to person, place, and time.   Psychiatric:         Mood and Affect: Mood normal.         Behavior: Behavior normal.          EKG  Encounter Date: 02/11/24   ECG 12 lead   Result Value    Ventricular Rate 51    Atrial Rate 51    OK Interval 296    QRS Duration 106    QT Interval 440    QTC Calculation(Bazett) 405    P Axis 22    R Axis -48    T Axis 44    QRS Count 8    Q Onset 205    T Offset 425    QTC Fredericia 417    Narrative    Sinus bradycardia with 1st degree AV block  Low voltage QRS  Incomplete right bundle branch block  Left anterior fascicular block  Abnormal ECG  No previous ECGs available  See ED provider note for full interpretation and clinical correlation  Confirmed by Kaylie Rich ([id number]) on 2/12/2024 4:40:07 PM       Problem List Items Addressed This Visit       High coronary artery calcium score     5/14/19 total calcium score 2537         Ischemia of digits of hand    Abnormal electrocardiogram (ECG) (EKG)    Dilated aortic root (CMS-HCC)    Sinus bradycardia    RBBB - Primary    Buerger's disease (CMS-HCC)     Other  Visit Diagnoses       Dizziness                  Return 1 months to discuss having stress thallium and echocafdiogram    Dash Prince MD

## 2024-05-09 NOTE — ASSESSMENT & PLAN NOTE
4/17-19/24 sinus between 41- 103 avg HR = 64, no afib, no PSVT, no high grade AV block, no VT (Ramicone)

## 2024-05-16 ENCOUNTER — LAB (OUTPATIENT)
Dept: LAB | Facility: LAB | Age: 64
End: 2024-05-16
Payer: MEDICARE

## 2024-05-16 DIAGNOSIS — R97.20 ELEVATED PSA: ICD-10-CM

## 2024-05-16 DIAGNOSIS — E11.9 TYPE 2 DIABETES MELLITUS WITHOUT COMPLICATION, WITHOUT LONG-TERM CURRENT USE OF INSULIN (MULTI): ICD-10-CM

## 2024-05-16 DIAGNOSIS — E08.40 DIABETES MELLITUS DUE TO UNDERLYING CONDITION WITH DIABETIC NEUROPATHY, WITHOUT LONG-TERM CURRENT USE OF INSULIN (MULTI): ICD-10-CM

## 2024-05-16 DIAGNOSIS — R93.3 ABNORMAL FINDINGS ON DIAGNOSTIC IMAGING OF OTHER PARTS OF DIGESTIVE TRACT: ICD-10-CM

## 2024-05-16 DIAGNOSIS — I99.8 ISCHEMIA OF DIGITS OF HAND: ICD-10-CM

## 2024-05-16 LAB
ALBUMIN SERPL BCP-MCNC: 3.5 G/DL (ref 3.4–5)
ALP SERPL-CCNC: 68 U/L (ref 33–136)
ALT SERPL W P-5'-P-CCNC: 16 U/L (ref 10–52)
ANION GAP SERPL CALC-SCNC: 14 MMOL/L (ref 10–20)
AST SERPL W P-5'-P-CCNC: 10 U/L (ref 9–39)
BILIRUB SERPL-MCNC: 0.3 MG/DL (ref 0–1.2)
BUN SERPL-MCNC: 14 MG/DL (ref 6–23)
CALCIUM SERPL-MCNC: 8.8 MG/DL (ref 8.6–10.6)
CHLORIDE SERPL-SCNC: 106 MMOL/L (ref 98–107)
CHOLEST SERPL-MCNC: 87 MG/DL (ref 0–199)
CHOLESTEROL/HDL RATIO: 1.8
CO2 SERPL-SCNC: 22 MMOL/L (ref 21–32)
CREAT SERPL-MCNC: 0.73 MG/DL (ref 0.5–1.3)
EGFRCR SERPLBLD CKD-EPI 2021: >90 ML/MIN/1.73M*2
ERYTHROCYTE [DISTWIDTH] IN BLOOD BY AUTOMATED COUNT: 14 % (ref 11.5–14.5)
EST. AVERAGE GLUCOSE BLD GHB EST-MCNC: 146 MG/DL
GLUCOSE SERPL-MCNC: 126 MG/DL (ref 74–99)
HBA1C MFR BLD: 6.7 %
HCT VFR BLD AUTO: 31.5 % (ref 41–52)
HDLC SERPL-MCNC: 47.3 MG/DL
HGB BLD-MCNC: 10.8 G/DL (ref 13.5–17.5)
LDLC SERPL CALC-MCNC: 25 MG/DL
MCH RBC QN AUTO: 36.2 PG (ref 26–34)
MCHC RBC AUTO-ENTMCNC: 34.3 G/DL (ref 32–36)
MCV RBC AUTO: 106 FL (ref 80–100)
NON HDL CHOLESTEROL: 40 MG/DL (ref 0–149)
NRBC BLD-RTO: 0 /100 WBCS (ref 0–0)
PLATELET # BLD AUTO: 283 X10*3/UL (ref 150–450)
POTASSIUM SERPL-SCNC: 4.1 MMOL/L (ref 3.5–5.3)
PROT SERPL-MCNC: 6.2 G/DL (ref 6.4–8.2)
RBC # BLD AUTO: 2.98 X10*6/UL (ref 4.5–5.9)
SODIUM SERPL-SCNC: 138 MMOL/L (ref 136–145)
TRIGL SERPL-MCNC: 72 MG/DL (ref 0–149)
TSH SERPL-ACNC: 1.75 MIU/L (ref 0.44–3.98)
VLDL: 14 MG/DL (ref 0–40)
WBC # BLD AUTO: 5.7 X10*3/UL (ref 4.4–11.3)

## 2024-05-16 PROCEDURE — 36415 COLL VENOUS BLD VENIPUNCTURE: CPT

## 2024-05-16 PROCEDURE — 83036 HEMOGLOBIN GLYCOSYLATED A1C: CPT

## 2024-05-16 PROCEDURE — 85027 COMPLETE CBC AUTOMATED: CPT

## 2024-05-16 PROCEDURE — 84153 ASSAY OF PSA TOTAL: CPT

## 2024-05-16 PROCEDURE — 80061 LIPID PANEL: CPT

## 2024-05-16 PROCEDURE — 84443 ASSAY THYROID STIM HORMONE: CPT

## 2024-05-16 PROCEDURE — 80053 COMPREHEN METABOLIC PANEL: CPT

## 2024-05-16 PROCEDURE — 84154 ASSAY OF PSA FREE: CPT

## 2024-05-18 NOTE — RESULT ENCOUNTER NOTE
Domenic Ferrari-  The diabetes is a little worse but still <7% and should consider Metformin again   The anemia is worse and we have to discuss diet and evaluate at appointment but meantime no alcohol and no motrin / advil/ eleve type medication   Rest of the labs are good range   PSA is pending still

## 2024-05-19 LAB
PSA FREE MFR SERPL: 24 %
PSA FREE SERPL-MCNC: 1.2 NG/ML
PSA SERPL IA-MCNC: 4.9 NG/ML (ref 0–4)

## 2024-05-19 NOTE — RESULT ENCOUNTER NOTE
Domenic Ferrari-  The PSA is stable range and it seems to be lower risk for cancer range around 24 %

## 2024-05-21 NOTE — PROGRESS NOTES
PCP: Kathleen Sena MD  Cardiologist: Suresh Dennis   Vega Rowe is a 64 y.o. male who complains of BUE gangrene .     Stopped smoking 1-2 months ago.   Has been smoking 50 pack years.  Concurrent Raynaud's.    Review of Systems:  Otherwise, limited cardiovascular review of systems is negative.    Past Medical History:  He has a past medical history of Abnormal weight loss (12/08/2021), Encounter for screening for cardiovascular disorders, Other specified abnormal findings of blood chemistry (12/08/2021), Personal history of other diseases of the musculoskeletal system and connective tissue, Personal history of other malignant neoplasm of skin, Personal history of other mental and behavioral disorders (10/01/2016), and Personal history of other specified conditions.    Surgical History:   He has a past surgical history that includes Other surgical history (12/10/2012); Other surgical history (12/10/2012); Lumbar discectomy (12/10/2012); and Other surgical history (12/10/2012).    Family History:   Family History   Problem Relation Name Age of Onset    Breast cancer Mother  49    Aneurysm Father          of abdominal aorta    Asthma Sister         Social History:   Tobacco Use: Medium Risk (5/9/2024)    Patient History     Smoking Tobacco Use: Former     Smokeless Tobacco Use: Never     Passive Exposure: Not on file       Outpatient Medications:    Current Outpatient Medications:     amLODIPine (Norvasc) 10 mg tablet, Take 1 tablet (10 mg) by mouth once daily., Disp: 90 tablet, Rfl: 3    aspirin 81 mg EC tablet, Take 1 tablet (81 mg) by mouth once daily., Disp: , Rfl:     gabapentin (Neurontin) 600 mg tablet, Take 1 tablet (600 mg) by mouth 3 times a day., Disp: 90 tablet, Rfl: 5    ibuprofen 600 mg tablet, Take 1 tablet (600 mg) by mouth 3 times a day., Disp: , Rfl:     mupirocin (Bactroban) 2 % ointment, Apply a small amount 3 times daily as directed, Disp: , Rfl:     nitroglycerin (Nitro-Bid) 2 %  "ointment, Place 0.5 inches on the skin every 6 hours., Disp: 30 g, Rfl: 0    triamcinolone (Kenalog) 0.1 % cream, Apply 1 Application topically 2 times a day., Disp: , Rfl:      Allergies:  Patient has no known allergies.       Objective   Vital Signs:  /70 (BP Location: Left arm, Patient Position: Sitting, BP Cuff Size: Adult)   Ht 1.905 m (6' 3\")   Wt 86.5 kg (190 lb 12.8 oz)   BMI 23.85 kg/m²     Physical Exam:  General: no acute distress  HEENT: EOMI, no scleral icterus.  Lungs: Clear to auscultation bilaterally without wheezing, rales, or rhonchi.  Cardiovascular: Regular rhythm and rate. Normal S1 and S2. No murmurs, rubs, or gallops are appreciated. JVP normal.  no bruits  Abdomen: Soft, nontender, nondistended. Bowel sounds present.  Extremities: 2+ radial and ulnar pulses; 2+ distal BLE pulses. BUE ischemic changes.  Neurologic: Alert and oriented x3.              Pertinent Recent Cardiovascular Studies (personally reviewed):  Vascular studies:  LETICIA/TBI 4/26/23: biphasic Dopplers BUE radial and ulnar; flattened digital waveforms in all digits except L 4th/5th digits  Carotid duplex 5/19/23: no carotid stenosis  Ao duplex 5/19/23: no aneurysm    Laboratory values:  CMP:  Recent Labs     05/16/24  0752 02/11/24  1325 06/07/23  1141 04/03/23  1346 06/29/22  0909 12/08/21  0847 07/16/21  0635 07/15/21  0729    135* 137 138 139 135* 137 138   K 4.1 3.6 4.2 4.3 4.2 4.9 4.3 3.6    104 101 103 102 98 102 101   CO2 22 22 26 28 25 31 32 30   ANIONGAP 14 13 14 11 16 11 7* 11   BUN 14 14 16 9 13 11 12 11   CREATININE 0.73 0.89 0.71 0.71 0.88 0.84 0.88 0.77   EGFR >90 >90  --   --   --   --   --   --    MG  --  1.67  --   --   --   --   --   --      Recent Labs     05/16/24  0752 06/07/23  1141 04/03/23  1346 06/29/22  0909 12/08/21  0847   ALBUMIN 3.5 4.6 4.5 4.7 4.5   ALKPHOS 68 68 65 64 82   ALT 16 22 15 38 39   AST 10 23 14 37 27   BILITOT 0.3 0.4 0.3 0.8 0.6     CBC:  Recent Labs     " "05/16/24  0752 02/11/24  1325 06/07/23  1141 04/03/23  1346 06/29/22  0909 12/08/21  0847 07/16/21  0635 07/15/21  0729   WBC 5.7 5.3 6.7 8.9 4.9 6.9 7.0 6.2   HGB 10.8* 11.1* 12.4* 12.2* 14.1 15.1 12.0* 13.5   HCT 31.5* 33.2* 36.3* 36.1* 41.9 43.3 36.9* 40.4*    189 251 284 212 227 196 216   * 100 103* 101* 98 99 98 96     COAG: No results for input(s): \"PTT\", \"INR\", \"HAUF\", \"DDIMERVTE\", \"HAPTOGLOBIN\", \"FIBRINOGEN\" in the last 28018 hours.  ABO: No results for input(s): \"ABO\" in the last 98830 hours.  HEME/ENDO:  Recent Labs     05/16/24  0752 06/07/23  1141 06/29/22  0909 12/08/21  0847 06/08/21  0713   TSH 1.75 0.83 2.55 2.65 2.08   HGBA1C 6.7* 6.0*  --  6.9* 6.5      CARDIAC:   Recent Labs     02/11/24  1325   TROPHS 5     Recent Labs     05/16/24  0752 06/07/23  1141 06/29/22  0909 12/08/21  0847   CHOL 87 139 127 137   LDLF 25  55 43 69   HDL 47.3 71.1 76.0 54.0   TRIG 72 63 42 71     MICRO:   Recent Labs     04/03/23  1346   CRP <0.10     No results found for the last 90 days.         I have personally reviewed most recent PCP, cardiology, vascular, and/or podiatry documentation.      Assessment/Plan   64 y.o. male with  BUE digital ischemia  in the background of hypertension and smoking/ tobacco exposure. C/w Raynaud's and Bergers. Improvement with cessation of cigarette use.    Plan:  Start medications for wounds    - cilostazol should be started at 1/2 tab twice daily    - if tolerating after 1 week, increase to 1 tab twice daily    Keep up the good work with smoking cessation    Referral to Dr. Aguiar    Follow up June       SIGNATURE: Rush Newberry MD PATIENT NAME: Vega Rowe   DATE/TIME: May 23, 2024 09:30 AM MRN: [id number]     "
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
## 2024-05-22 ENCOUNTER — OFFICE VISIT (OUTPATIENT)
Dept: PRIMARY CARE | Facility: CLINIC | Age: 64
End: 2024-05-22
Payer: MEDICARE

## 2024-05-22 VITALS
DIASTOLIC BLOOD PRESSURE: 60 MMHG | HEIGHT: 75 IN | WEIGHT: 190 LBS | HEART RATE: 62 BPM | BODY MASS INDEX: 23.62 KG/M2 | OXYGEN SATURATION: 98 % | SYSTOLIC BLOOD PRESSURE: 128 MMHG | TEMPERATURE: 97 F

## 2024-05-22 DIAGNOSIS — R42 LIGHTHEADEDNESS: ICD-10-CM

## 2024-05-22 DIAGNOSIS — I77.810 DILATED AORTIC ROOT (CMS-HCC): ICD-10-CM

## 2024-05-22 DIAGNOSIS — I45.10 RIGHT BUNDLE BRANCH BLOCK: ICD-10-CM

## 2024-05-22 DIAGNOSIS — I10 PRIMARY HYPERTENSION: ICD-10-CM

## 2024-05-22 DIAGNOSIS — E08.40 DIABETES MELLITUS DUE TO UNDERLYING CONDITION WITH DIABETIC NEUROPATHY, WITHOUT LONG-TERM CURRENT USE OF INSULIN (MULTI): ICD-10-CM

## 2024-05-22 DIAGNOSIS — R93.1 HIGH CORONARY ARTERY CALCIUM SCORE: ICD-10-CM

## 2024-05-22 DIAGNOSIS — K86.0 ALCOHOL-INDUCED CHRONIC PANCREATITIS (MULTI): ICD-10-CM

## 2024-05-22 DIAGNOSIS — Z00.00 ROUTINE GENERAL MEDICAL EXAMINATION AT HEALTH CARE FACILITY: Primary | ICD-10-CM

## 2024-05-22 DIAGNOSIS — I99.8 ISCHEMIA OF DIGITS OF HAND: ICD-10-CM

## 2024-05-22 DIAGNOSIS — E78.5 HYPERLIPIDEMIA, UNSPECIFIED HYPERLIPIDEMIA TYPE: ICD-10-CM

## 2024-05-22 DIAGNOSIS — R97.20 ELEVATED PSA: ICD-10-CM

## 2024-05-22 DIAGNOSIS — I73.1 BUERGER'S DISEASE (CMS-HCC): ICD-10-CM

## 2024-05-22 DIAGNOSIS — N02.B9 PRIMARY IGA NEPHROPATHY: ICD-10-CM

## 2024-05-22 PROCEDURE — 1170F FXNL STATUS ASSESSED: CPT | Performed by: INTERNAL MEDICINE

## 2024-05-22 PROCEDURE — 3048F LDL-C <100 MG/DL: CPT | Performed by: INTERNAL MEDICINE

## 2024-05-22 PROCEDURE — 3074F SYST BP LT 130 MM HG: CPT | Performed by: INTERNAL MEDICINE

## 2024-05-22 PROCEDURE — 99214 OFFICE O/P EST MOD 30 MIN: CPT | Performed by: INTERNAL MEDICINE

## 2024-05-22 PROCEDURE — 1159F MED LIST DOCD IN RCRD: CPT | Performed by: INTERNAL MEDICINE

## 2024-05-22 PROCEDURE — 3044F HG A1C LEVEL LT 7.0%: CPT | Performed by: INTERNAL MEDICINE

## 2024-05-22 PROCEDURE — 99397 PER PM REEVAL EST PAT 65+ YR: CPT | Performed by: INTERNAL MEDICINE

## 2024-05-22 PROCEDURE — G0443 BRIEF ALCOHOL MISUSE COUNSEL: HCPCS | Performed by: INTERNAL MEDICINE

## 2024-05-22 PROCEDURE — 1160F RVW MEDS BY RX/DR IN RCRD: CPT | Performed by: INTERNAL MEDICINE

## 2024-05-22 PROCEDURE — 3078F DIAST BP <80 MM HG: CPT | Performed by: INTERNAL MEDICINE

## 2024-05-22 PROCEDURE — G0446 INTENS BEHAVE THER CARDIO DX: HCPCS | Performed by: INTERNAL MEDICINE

## 2024-05-22 PROCEDURE — G0438 PPPS, INITIAL VISIT: HCPCS | Performed by: INTERNAL MEDICINE

## 2024-05-22 PROCEDURE — 1036F TOBACCO NON-USER: CPT | Performed by: INTERNAL MEDICINE

## 2024-05-22 PROCEDURE — 1124F ACP DISCUSS-NO DSCNMKR DOCD: CPT | Performed by: INTERNAL MEDICINE

## 2024-05-22 ASSESSMENT — PATIENT HEALTH QUESTIONNAIRE - PHQ9
2. FEELING DOWN, DEPRESSED OR HOPELESS: NOT AT ALL
SUM OF ALL RESPONSES TO PHQ9 QUESTIONS 1 AND 2: 0
1. LITTLE INTEREST OR PLEASURE IN DOING THINGS: NOT AT ALL

## 2024-05-22 ASSESSMENT — ENCOUNTER SYMPTOMS
OCCASIONAL FEELINGS OF UNSTEADINESS: 0
DEPRESSION: 0
LOSS OF SENSATION IN FEET: 0

## 2024-05-22 ASSESSMENT — ACTIVITIES OF DAILY LIVING (ADL)
GROCERY_SHOPPING: INDEPENDENT
DRESSING: INDEPENDENT
TAKING_MEDICATION: INDEPENDENT
DOING_HOUSEWORK: INDEPENDENT
BATHING: INDEPENDENT
MANAGING_FINANCES: INDEPENDENT

## 2024-05-22 NOTE — PATIENT INSTRUCTIONS
It was a pleasure to see you today.  I would like to remind you about importance of a healthy lifestyle in order to improve your well-being and live longer. Try to engage in physical activities for at least 150 minutes per week.  Eat about 10 servings of fruits and vegetables daily. My advice is 2 servings of fruits and 8 servings of vegetables. For vegetables choose at least half of them green and at least half of them fresh.  Please avoid sugar, salt, fried food and saturated fat.   Please follow low carbohydrate diet and daily exercise routine for at least 30 minutes. Nutritional consultation is available, please let me know if you are interested. I will be happy to discuss details with you if interested.   Have a good day and stay well.      The ability to age comfortably depends on how you invest in your body.   Include physical activity in your daily routine. Physical activity increases blood flow to your whole body, including your brain. ...  Eat a healthy diet. A heart-healthy diet may benefit your brain.   Stay mentally active. Be social.   Treat cardiovascular disease.  No smoking, excessive EtOH intake or illicit drug use.

## 2024-05-22 NOTE — PROGRESS NOTES
"Subjective   Reason for Visit: Vega Rowe is an 64 y.o. male here for a Medicare Wellness visit.     Past Medical, Surgical, and Family History reviewed and updated in chart.    Reviewed all medications by prescribing practitioner or clinical pharmacist (such as prescriptions, OTCs, herbal therapies and supplements) and documented in the medical record.    HPI    Patient Care Team:  Kathleen Sena MD as PCP - General  Kathleen Sena MD as PCP - [employer] PCP  Dash Prince MD as Consulting Physician (Cardiology)     Review of Systems    Objective   Vitals:  /60 (BP Location: Left arm, Patient Position: Sitting)   Pulse 62   Temp 36.1 °C (97 °F) (Temporal)   Ht 1.905 m (6' 3\")   Wt 86.2 kg (190 lb)   SpO2 98%   BMI 23.75 kg/m²       Physical Exam    Assessment/Plan   Problem List Items Addressed This Visit    None  Visit Diagnoses       Routine general medical examination at health care facility    -  Primary          Please note that 15 min were spent discussing this patients cardiac risk and recommendations are in the note      Also be aware that 15 min were spent with the discussion of his alcohol misuse now . He is using it for pain control . His fingers from Jay disease are very painful and he is at high risk of addiction with pain medication but alcohol is also a huge issue with his diabetes and balance   He is having issues already form it with 2 ER visits   He declines the need for any intervention and will keep the alcohol at a minimal  for now until the finger pain is dealt with      "

## 2024-05-23 ENCOUNTER — OFFICE VISIT (OUTPATIENT)
Dept: CARDIOLOGY | Facility: CLINIC | Age: 64
End: 2024-05-23
Payer: MEDICARE

## 2024-05-23 VITALS
BODY MASS INDEX: 23.72 KG/M2 | DIASTOLIC BLOOD PRESSURE: 70 MMHG | WEIGHT: 190.8 LBS | SYSTOLIC BLOOD PRESSURE: 138 MMHG | HEIGHT: 75 IN

## 2024-05-23 DIAGNOSIS — I99.8 ISCHEMIA OF DIGITS OF HAND: ICD-10-CM

## 2024-05-23 DIAGNOSIS — I73.1 BUERGER'S DISEASE (CMS-HCC): Primary | ICD-10-CM

## 2024-05-23 PROCEDURE — 3075F SYST BP GE 130 - 139MM HG: CPT | Performed by: INTERNAL MEDICINE

## 2024-05-23 PROCEDURE — 99214 OFFICE O/P EST MOD 30 MIN: CPT | Performed by: INTERNAL MEDICINE

## 2024-05-23 PROCEDURE — 3048F LDL-C <100 MG/DL: CPT | Performed by: INTERNAL MEDICINE

## 2024-05-23 PROCEDURE — 1123F ACP DISCUSS/DSCN MKR DOCD: CPT | Performed by: INTERNAL MEDICINE

## 2024-05-23 PROCEDURE — 3044F HG A1C LEVEL LT 7.0%: CPT | Performed by: INTERNAL MEDICINE

## 2024-05-23 PROCEDURE — 1159F MED LIST DOCD IN RCRD: CPT | Performed by: INTERNAL MEDICINE

## 2024-05-23 PROCEDURE — 1160F RVW MEDS BY RX/DR IN RCRD: CPT | Performed by: INTERNAL MEDICINE

## 2024-05-23 PROCEDURE — 3078F DIAST BP <80 MM HG: CPT | Performed by: INTERNAL MEDICINE

## 2024-05-23 RX ORDER — ACETAMINOPHEN 325 MG/1
325 TABLET ORAL EVERY 6 HOURS PRN
COMMUNITY

## 2024-05-23 RX ORDER — ISOSORBIDE MONONITRATE 30 MG/1
30 TABLET, EXTENDED RELEASE ORAL DAILY
Qty: 30 TABLET | Refills: 11 | Status: SHIPPED | OUTPATIENT
Start: 2024-05-23 | End: 2025-05-23

## 2024-05-23 RX ORDER — CILOSTAZOL 100 MG/1
100 TABLET ORAL 2 TIMES DAILY
Qty: 60 TABLET | Refills: 11 | Status: SHIPPED | OUTPATIENT
Start: 2024-05-23 | End: 2025-05-23

## 2024-05-23 NOTE — PATIENT INSTRUCTIONS
Start medications for wounds    - cilostazol should be started at 1/2 tab twice daily    - if tolerating after 1 week, increase to 1 tab twice daily    Keep up the good work with smoking cessation

## 2024-06-10 ENCOUNTER — OFFICE VISIT (OUTPATIENT)
Dept: CARDIOLOGY | Facility: CLINIC | Age: 64
End: 2024-06-10
Payer: MEDICARE

## 2024-06-10 VITALS
SYSTOLIC BLOOD PRESSURE: 112 MMHG | BODY MASS INDEX: 23.75 KG/M2 | WEIGHT: 191 LBS | HEIGHT: 75 IN | DIASTOLIC BLOOD PRESSURE: 70 MMHG | HEART RATE: 62 BPM

## 2024-06-10 DIAGNOSIS — R42 LIGHTHEADEDNESS: ICD-10-CM

## 2024-06-10 DIAGNOSIS — I99.8 ISCHEMIA OF DIGITS OF HAND: ICD-10-CM

## 2024-06-10 DIAGNOSIS — I45.10 RIGHT BUNDLE BRANCH BLOCK: ICD-10-CM

## 2024-06-10 DIAGNOSIS — R00.1 SINUS BRADYCARDIA: ICD-10-CM

## 2024-06-10 DIAGNOSIS — I77.810 DILATED AORTIC ROOT (CMS-HCC): ICD-10-CM

## 2024-06-10 DIAGNOSIS — R93.1 HIGH CORONARY ARTERY CALCIUM SCORE: ICD-10-CM

## 2024-06-10 DIAGNOSIS — I73.1 BUERGER'S DISEASE (CMS-HCC): Primary | ICD-10-CM

## 2024-06-10 PROCEDURE — 1160F RVW MEDS BY RX/DR IN RCRD: CPT | Performed by: INTERNAL MEDICINE

## 2024-06-10 PROCEDURE — 99214 OFFICE O/P EST MOD 30 MIN: CPT | Performed by: INTERNAL MEDICINE

## 2024-06-10 PROCEDURE — 3044F HG A1C LEVEL LT 7.0%: CPT | Performed by: INTERNAL MEDICINE

## 2024-06-10 PROCEDURE — 3048F LDL-C <100 MG/DL: CPT | Performed by: INTERNAL MEDICINE

## 2024-06-10 PROCEDURE — 1159F MED LIST DOCD IN RCRD: CPT | Performed by: INTERNAL MEDICINE

## 2024-06-10 PROCEDURE — 3078F DIAST BP <80 MM HG: CPT | Performed by: INTERNAL MEDICINE

## 2024-06-10 PROCEDURE — 1123F ACP DISCUSS/DSCN MKR DOCD: CPT | Performed by: INTERNAL MEDICINE

## 2024-06-10 PROCEDURE — 3074F SYST BP LT 130 MM HG: CPT | Performed by: INTERNAL MEDICINE

## 2024-06-10 NOTE — PROGRESS NOTES
"  Subjective  Vega Rowe  is a 64 y.o. year old male who presents for cardiac evluation. For elevated coronary calcium.  He saw Dr. Rush Newberry 5/23/24.  Refuses stress thallium and echocardiogram    Blood pressure 112/70, pulse 62, height 1.905 m (6' 3\"), weight 86.6 kg (191 lb).   Patient has no known allergies.  Past Medical History:   Diagnosis Date    Abnormal weight loss 12/08/2021    Weight loss, unintentional    Encounter for screening for cardiovascular disorders     Encounter for screening for cardiovascular disorders    Other specified abnormal findings of blood chemistry 12/08/2021    Elevated liver function tests    Personal history of other diseases of the musculoskeletal system and connective tissue     History of backache    Personal history of other malignant neoplasm of skin     History of basal cell carcinoma    Personal history of other mental and behavioral disorders 10/01/2016    History of depression    Personal history of other specified conditions     History of abdominal pain     Past Surgical History:   Procedure Laterality Date    LUMBAR DISCECTOMY  12/10/2012    Spinal Diskectomy    OTHER SURGICAL HISTORY  12/10/2012    Destruction Of Malignant Lesion    OTHER SURGICAL HISTORY  12/10/2012    Dermal Autograft    OTHER SURGICAL HISTORY  12/10/2012    Acellular Dermal Allograft Face     Family History   Problem Relation Name Age of Onset    Breast cancer Mother  49    Aneurysm Father          of abdominal aorta    Asthma Sister       @SOC    Current Outpatient Medications   Medication Sig Dispense Refill    acetaminophen (Tylenol) 325 mg tablet Take 1 tablet (325 mg) by mouth every 6 hours if needed for mild pain (1 - 3).      amLODIPine (Norvasc) 10 mg tablet Take 1 tablet (10 mg) by mouth once daily. 90 tablet 3    aspirin 81 mg EC tablet Take 1 tablet (81 mg) by mouth once daily.      cilostazol (Pletal) 100 mg tablet Take 1 tablet (100 mg) by mouth 2 times a day. 60 tablet 11    " gabapentin (Neurontin) 600 mg tablet Take 1 tablet (600 mg) by mouth 3 times a day. 90 tablet 5    ibuprofen 600 mg tablet Take 1 tablet (600 mg) by mouth 3 times a day.      isosorbide mononitrate ER (Imdur) 30 mg 24 hr tablet Take 1 tablet (30 mg) by mouth once daily. Do not crush or chew. 30 tablet 11    nitroglycerin (Nitro-Bid) 2 % ointment Place 0.5 inches on the skin every 6 hours. 30 g 0     No current facility-administered medications for this visit.        ROS  Review of Systems   All other systems reviewed and are negative.      Physical Exam  Physical Exam  Constitutional:       Appearance: Normal appearance.   HENT:      Head: Normocephalic and atraumatic.   Cardiovascular:      Rate and Rhythm: Normal rate and regular rhythm.   Pulmonary:      Comments: Decreased breath sounds throughout  Abdominal:      General: Abdomen is flat.   Skin:     General: Skin is warm and dry.   Neurological:      General: No focal deficit present.      Mental Status: He is alert and oriented to person, place, and time.   Psychiatric:         Mood and Affect: Mood normal.         Behavior: Behavior normal.          EKG  Encounter Date: 02/11/24   ECG 12 lead   Result Value    Ventricular Rate 51    Atrial Rate 51    MI Interval 296    QRS Duration 106    QT Interval 440    QTC Calculation(Bazett) 405    P Axis 22    R Axis -48    T Axis 44    QRS Count 8    Q Onset 205    T Offset 425    QTC Fredericia 417    Narrative    Sinus bradycardia with 1st degree AV block  Low voltage QRS  Incomplete right bundle branch block  Left anterior fascicular block  Abnormal ECG  No previous ECGs available  See ED provider note for full interpretation and clinical correlation  Confirmed by Kaylie Rich ([id number]) on 2/12/2024 4:40:07 PM       Problem List Items Addressed This Visit       High coronary artery calcium score    Relevant Orders    Follow Up In Cardiology    Ischemia of digits of hand    Dilated aortic root (CMS-HCC)    Sinus  bradycardia     4/17/24 Holter sinus between 41 and 103 with avg HR=64,  No afib, no PSVT, no high grade AV block, no VT         Lightheadedness    Right bundle branch block    Buerger's disease (CMS-HCC) - Primary     Follws with Dr. Rush Newberry.  To see Dr. Aguiar              Same meds with EKG at 3 months follow up      Dash Prince MD

## 2024-06-16 NOTE — ASSESSMENT & PLAN NOTE
4/17/24 Holter sinus between 41 and 103 with avg HR=64,  No afib, no PSVT, no high grade AV block, no VT

## 2024-07-01 ENCOUNTER — HOSPITAL ENCOUNTER (INPATIENT)
Facility: HOSPITAL | Age: 64
LOS: 2 days | Discharge: HOME | DRG: 378 | End: 2024-07-04
Attending: STUDENT IN AN ORGANIZED HEALTH CARE EDUCATION/TRAINING PROGRAM | Admitting: STUDENT IN AN ORGANIZED HEALTH CARE EDUCATION/TRAINING PROGRAM
Payer: MEDICARE

## 2024-07-01 ENCOUNTER — APPOINTMENT (OUTPATIENT)
Dept: RADIOLOGY | Facility: HOSPITAL | Age: 64
DRG: 378 | End: 2024-07-01
Payer: MEDICARE

## 2024-07-01 DIAGNOSIS — R00.1 SINUS BRADYCARDIA: ICD-10-CM

## 2024-07-01 DIAGNOSIS — K92.2 GASTROINTESTINAL HEMORRHAGE, UNSPECIFIED GASTROINTESTINAL HEMORRHAGE TYPE: Primary | ICD-10-CM

## 2024-07-01 LAB
ABO GROUP (TYPE) IN BLOOD: NORMAL
ABO GROUP (TYPE) IN BLOOD: NORMAL
ALBUMIN SERPL BCP-MCNC: 3.2 G/DL (ref 3.4–5)
ALP SERPL-CCNC: 87 U/L (ref 33–136)
ALT SERPL W P-5'-P-CCNC: 22 U/L (ref 10–52)
ANION GAP SERPL CALC-SCNC: 13 MMOL/L (ref 10–20)
ANTIBODY SCREEN: NORMAL
APTT PPP: 34 SECONDS (ref 27–38)
AST SERPL W P-5'-P-CCNC: 13 U/L (ref 9–39)
BASOPHILS # BLD AUTO: 0.08 X10*3/UL (ref 0–0.1)
BASOPHILS NFR BLD AUTO: 1.1 %
BILIRUB SERPL-MCNC: 0.3 MG/DL (ref 0–1.2)
BUN SERPL-MCNC: 11 MG/DL (ref 6–23)
CALCIUM SERPL-MCNC: 8.3 MG/DL (ref 8.6–10.3)
CHLORIDE SERPL-SCNC: 101 MMOL/L (ref 98–107)
CO2 SERPL-SCNC: 26 MMOL/L (ref 21–32)
CREAT SERPL-MCNC: 0.83 MG/DL (ref 0.5–1.3)
EGFRCR SERPLBLD CKD-EPI 2021: >90 ML/MIN/1.73M*2
EOSINOPHIL # BLD AUTO: 0.28 X10*3/UL (ref 0–0.7)
EOSINOPHIL NFR BLD AUTO: 3.8 %
ERYTHROCYTE [DISTWIDTH] IN BLOOD BY AUTOMATED COUNT: 11.8 % (ref 11.5–14.5)
GLUCOSE SERPL-MCNC: 194 MG/DL (ref 74–99)
HCT VFR BLD AUTO: 18 % (ref 41–52)
HGB BLD-MCNC: 5.9 G/DL (ref 13.5–17.5)
HOLD SPECIMEN: NORMAL
HOLD SPECIMEN: NORMAL
HYPOCHROMIA BLD QL SMEAR: NORMAL
IMM GRANULOCYTES # BLD AUTO: 0.04 X10*3/UL (ref 0–0.7)
IMM GRANULOCYTES NFR BLD AUTO: 0.5 % (ref 0–0.9)
INR PPP: 1.4 (ref 0.9–1.1)
LIPASE SERPL-CCNC: <3 U/L (ref 9–82)
LYMPHOCYTES # BLD AUTO: 1.42 X10*3/UL (ref 1.2–4.8)
LYMPHOCYTES NFR BLD AUTO: 19.1 %
MCH RBC QN AUTO: 35.8 PG (ref 26–34)
MCHC RBC AUTO-ENTMCNC: 32.8 G/DL (ref 32–36)
MCV RBC AUTO: 109 FL (ref 80–100)
MONOCYTES # BLD AUTO: 0.52 X10*3/UL (ref 0.1–1)
MONOCYTES NFR BLD AUTO: 7 %
NEUTROPHILS # BLD AUTO: 5.1 X10*3/UL (ref 1.2–7.7)
NEUTROPHILS NFR BLD AUTO: 68.5 %
NRBC BLD-RTO: 0 /100 WBCS (ref 0–0)
PLATELET # BLD AUTO: 291 X10*3/UL (ref 150–450)
POLYCHROMASIA BLD QL SMEAR: NORMAL
POTASSIUM SERPL-SCNC: 3.4 MMOL/L (ref 3.5–5.3)
PROT SERPL-MCNC: 5.9 G/DL (ref 6.4–8.2)
PROTHROMBIN TIME: 16.1 SECONDS (ref 9.8–12.8)
RBC # BLD AUTO: 1.65 X10*6/UL (ref 4.5–5.9)
RBC MORPH BLD: NORMAL
RH FACTOR (ANTIGEN D): NORMAL
RH FACTOR (ANTIGEN D): NORMAL
SODIUM SERPL-SCNC: 137 MMOL/L (ref 136–145)
WBC # BLD AUTO: 7.4 X10*3/UL (ref 4.4–11.3)

## 2024-07-01 PROCEDURE — 2500000004 HC RX 250 GENERAL PHARMACY W/ HCPCS (ALT 636 FOR OP/ED): Performed by: STUDENT IN AN ORGANIZED HEALTH CARE EDUCATION/TRAINING PROGRAM

## 2024-07-01 PROCEDURE — 36430 TRANSFUSION BLD/BLD COMPNT: CPT

## 2024-07-01 PROCEDURE — 84100 ASSAY OF PHOSPHORUS: CPT

## 2024-07-01 PROCEDURE — 85025 COMPLETE CBC W/AUTO DIFF WBC: CPT | Performed by: STUDENT IN AN ORGANIZED HEALTH CARE EDUCATION/TRAINING PROGRAM

## 2024-07-01 PROCEDURE — 36415 COLL VENOUS BLD VENIPUNCTURE: CPT | Performed by: STUDENT IN AN ORGANIZED HEALTH CARE EDUCATION/TRAINING PROGRAM

## 2024-07-01 PROCEDURE — 86901 BLOOD TYPING SEROLOGIC RH(D): CPT | Performed by: STUDENT IN AN ORGANIZED HEALTH CARE EDUCATION/TRAINING PROGRAM

## 2024-07-01 PROCEDURE — 2550000001 HC RX 255 CONTRASTS: Performed by: STUDENT IN AN ORGANIZED HEALTH CARE EDUCATION/TRAINING PROGRAM

## 2024-07-01 PROCEDURE — 96375 TX/PRO/DX INJ NEW DRUG ADDON: CPT

## 2024-07-01 PROCEDURE — P9016 RBC LEUKOCYTES REDUCED: HCPCS

## 2024-07-01 PROCEDURE — C9113 INJ PANTOPRAZOLE SODIUM, VIA: HCPCS | Performed by: STUDENT IN AN ORGANIZED HEALTH CARE EDUCATION/TRAINING PROGRAM

## 2024-07-01 PROCEDURE — 86920 COMPATIBILITY TEST SPIN: CPT

## 2024-07-01 PROCEDURE — 83735 ASSAY OF MAGNESIUM: CPT

## 2024-07-01 PROCEDURE — 84075 ASSAY ALKALINE PHOSPHATASE: CPT | Performed by: STUDENT IN AN ORGANIZED HEALTH CARE EDUCATION/TRAINING PROGRAM

## 2024-07-01 PROCEDURE — 83690 ASSAY OF LIPASE: CPT | Performed by: STUDENT IN AN ORGANIZED HEALTH CARE EDUCATION/TRAINING PROGRAM

## 2024-07-01 PROCEDURE — 85610 PROTHROMBIN TIME: CPT | Performed by: STUDENT IN AN ORGANIZED HEALTH CARE EDUCATION/TRAINING PROGRAM

## 2024-07-01 PROCEDURE — 99291 CRITICAL CARE FIRST HOUR: CPT | Mod: 25 | Performed by: STUDENT IN AN ORGANIZED HEALTH CARE EDUCATION/TRAINING PROGRAM

## 2024-07-01 PROCEDURE — 74174 CTA ABD&PLVS W/CONTRAST: CPT

## 2024-07-01 PROCEDURE — 74174 CTA ABD&PLVS W/CONTRAST: CPT | Performed by: RADIOLOGY

## 2024-07-01 RX ORDER — PANTOPRAZOLE SODIUM 40 MG/10ML
40 INJECTION, POWDER, LYOPHILIZED, FOR SOLUTION INTRAVENOUS ONCE
Status: COMPLETED | OUTPATIENT
Start: 2024-07-01 | End: 2024-07-01

## 2024-07-01 RX ORDER — ONDANSETRON HYDROCHLORIDE 2 MG/ML
4 INJECTION, SOLUTION INTRAVENOUS ONCE
Status: COMPLETED | OUTPATIENT
Start: 2024-07-01 | End: 2024-07-01

## 2024-07-01 RX ORDER — OLANZAPINE 10 MG/2ML
10 INJECTION, POWDER, FOR SOLUTION INTRAMUSCULAR ONCE AS NEEDED
Status: DISCONTINUED | OUTPATIENT
Start: 2024-07-01 | End: 2024-07-01

## 2024-07-02 ENCOUNTER — APPOINTMENT (OUTPATIENT)
Dept: GASTROENTEROLOGY | Facility: HOSPITAL | Age: 64
DRG: 378 | End: 2024-07-02
Payer: MEDICARE

## 2024-07-02 ENCOUNTER — ANESTHESIA EVENT (OUTPATIENT)
Dept: GASTROENTEROLOGY | Facility: HOSPITAL | Age: 64
End: 2024-07-02
Payer: MEDICARE

## 2024-07-02 ENCOUNTER — ANESTHESIA (OUTPATIENT)
Dept: GASTROENTEROLOGY | Facility: HOSPITAL | Age: 64
End: 2024-07-02
Payer: MEDICARE

## 2024-07-02 ENCOUNTER — APPOINTMENT (OUTPATIENT)
Dept: CARDIOLOGY | Facility: HOSPITAL | Age: 64
DRG: 378 | End: 2024-07-02
Payer: MEDICARE

## 2024-07-02 LAB
ALBUMIN SERPL BCP-MCNC: 3 G/DL (ref 3.4–5)
ALP SERPL-CCNC: 81 U/L (ref 33–136)
ALT SERPL W P-5'-P-CCNC: 18 U/L (ref 10–52)
ANION GAP SERPL CALC-SCNC: 11 MMOL/L (ref 10–20)
AST SERPL W P-5'-P-CCNC: 11 U/L (ref 9–39)
BASOPHILS # BLD AUTO: 0.06 X10*3/UL (ref 0–0.1)
BASOPHILS NFR BLD AUTO: 1.1 %
BILIRUB SERPL-MCNC: 0.4 MG/DL (ref 0–1.2)
BUN SERPL-MCNC: 9 MG/DL (ref 6–23)
CALCIUM SERPL-MCNC: 7.9 MG/DL (ref 8.6–10.3)
CHLORIDE SERPL-SCNC: 104 MMOL/L (ref 98–107)
CO2 SERPL-SCNC: 27 MMOL/L (ref 21–32)
CREAT SERPL-MCNC: 0.61 MG/DL (ref 0.5–1.3)
EGFRCR SERPLBLD CKD-EPI 2021: >90 ML/MIN/1.73M*2
EOSINOPHIL # BLD AUTO: 0.2 X10*3/UL (ref 0–0.7)
EOSINOPHIL NFR BLD AUTO: 3.7 %
ERYTHROCYTE [DISTWIDTH] IN BLOOD BY AUTOMATED COUNT: 16.5 % (ref 11.5–14.5)
ERYTHROCYTE [DISTWIDTH] IN BLOOD BY AUTOMATED COUNT: 17.2 % (ref 11.5–14.5)
FOLATE SERPL-MCNC: 20 NG/ML
GLUCOSE BLD MANUAL STRIP-MCNC: 103 MG/DL (ref 74–99)
GLUCOSE BLD MANUAL STRIP-MCNC: 104 MG/DL (ref 74–99)
GLUCOSE BLD MANUAL STRIP-MCNC: 108 MG/DL (ref 74–99)
GLUCOSE BLD MANUAL STRIP-MCNC: 110 MG/DL (ref 74–99)
GLUCOSE BLD MANUAL STRIP-MCNC: 111 MG/DL (ref 74–99)
GLUCOSE BLD MANUAL STRIP-MCNC: 119 MG/DL (ref 74–99)
GLUCOSE SERPL-MCNC: 113 MG/DL (ref 74–99)
HCT VFR BLD AUTO: 22 % (ref 41–52)
HCT VFR BLD AUTO: 22 % (ref 41–52)
HGB BLD-MCNC: 7.4 G/DL (ref 13.5–17.5)
HGB BLD-MCNC: 7.5 G/DL (ref 13.5–17.5)
IMM GRANULOCYTES # BLD AUTO: 0.04 X10*3/UL (ref 0–0.7)
IMM GRANULOCYTES NFR BLD AUTO: 0.7 % (ref 0–0.9)
LACTATE SERPL-SCNC: 0.6 MMOL/L (ref 0.4–2)
LYMPHOCYTES # BLD AUTO: 1.21 X10*3/UL (ref 1.2–4.8)
LYMPHOCYTES NFR BLD AUTO: 22.5 %
MAGNESIUM SERPL-MCNC: 1.51 MG/DL (ref 1.6–2.4)
MAGNESIUM SERPL-MCNC: 1.61 MG/DL (ref 1.6–2.4)
MCH RBC QN AUTO: 33.6 PG (ref 26–34)
MCH RBC QN AUTO: 33.8 PG (ref 26–34)
MCHC RBC AUTO-ENTMCNC: 33.6 G/DL (ref 32–36)
MCHC RBC AUTO-ENTMCNC: 34.1 G/DL (ref 32–36)
MCV RBC AUTO: 101 FL (ref 80–100)
MCV RBC AUTO: 99 FL (ref 80–100)
MONOCYTES # BLD AUTO: 0.48 X10*3/UL (ref 0.1–1)
MONOCYTES NFR BLD AUTO: 8.9 %
NEUTROPHILS # BLD AUTO: 3.39 X10*3/UL (ref 1.2–7.7)
NEUTROPHILS NFR BLD AUTO: 63.1 %
NRBC BLD-RTO: 0 /100 WBCS (ref 0–0)
NRBC BLD-RTO: 0 /100 WBCS (ref 0–0)
PHOSPHATE SERPL-MCNC: 2.9 MG/DL (ref 2.5–4.9)
PLATELET # BLD AUTO: 238 X10*3/UL (ref 150–450)
PLATELET # BLD AUTO: 246 X10*3/UL (ref 150–450)
POTASSIUM SERPL-SCNC: 3.7 MMOL/L (ref 3.5–5.3)
PROT SERPL-MCNC: 5.5 G/DL (ref 6.4–8.2)
RBC # BLD AUTO: 2.19 X10*6/UL (ref 4.5–5.9)
RBC # BLD AUTO: 2.23 X10*6/UL (ref 4.5–5.9)
SODIUM SERPL-SCNC: 138 MMOL/L (ref 136–145)
VIT B12 SERPL-MCNC: 336 PG/ML (ref 211–911)
WBC # BLD AUTO: 5.4 X10*3/UL (ref 4.4–11.3)
WBC # BLD AUTO: 6.5 X10*3/UL (ref 4.4–11.3)

## 2024-07-02 PROCEDURE — 82746 ASSAY OF FOLIC ACID SERUM: CPT | Mod: PARLAB

## 2024-07-02 PROCEDURE — 82607 VITAMIN B-12: CPT | Mod: PARLAB

## 2024-07-02 PROCEDURE — 36415 COLL VENOUS BLD VENIPUNCTURE: CPT

## 2024-07-02 PROCEDURE — 3E0G8GC INTRODUCTION OF OTHER THERAPEUTIC SUBSTANCE INTO UPPER GI, VIA NATURAL OR ARTIFICIAL OPENING ENDOSCOPIC: ICD-10-PCS | Performed by: STUDENT IN AN ORGANIZED HEALTH CARE EDUCATION/TRAINING PROGRAM

## 2024-07-02 PROCEDURE — 3700000001 HC GENERAL ANESTHESIA TIME - INITIAL BASE CHARGE

## 2024-07-02 PROCEDURE — 85025 COMPLETE CBC W/AUTO DIFF WBC: CPT

## 2024-07-02 PROCEDURE — 88305 TISSUE EXAM BY PATHOLOGIST: CPT | Performed by: PATHOLOGY

## 2024-07-02 PROCEDURE — 3700000002 HC GENERAL ANESTHESIA TIME - EACH INCREMENTAL 1 MINUTE

## 2024-07-02 PROCEDURE — 99223 1ST HOSP IP/OBS HIGH 75: CPT | Performed by: STUDENT IN AN ORGANIZED HEALTH CARE EDUCATION/TRAINING PROGRAM

## 2024-07-02 PROCEDURE — 99222 1ST HOSP IP/OBS MODERATE 55: CPT | Performed by: NURSE PRACTITIONER

## 2024-07-02 PROCEDURE — 1200000002 HC GENERAL ROOM WITH TELEMETRY DAILY

## 2024-07-02 PROCEDURE — C9113 INJ PANTOPRAZOLE SODIUM, VIA: HCPCS | Performed by: NURSE PRACTITIONER

## 2024-07-02 PROCEDURE — 99223 1ST HOSP IP/OBS HIGH 75: CPT

## 2024-07-02 PROCEDURE — 0DB98ZX EXCISION OF DUODENUM, VIA NATURAL OR ARTIFICIAL OPENING ENDOSCOPIC, DIAGNOSTIC: ICD-10-PCS | Performed by: STUDENT IN AN ORGANIZED HEALTH CARE EDUCATION/TRAINING PROGRAM

## 2024-07-02 PROCEDURE — 84075 ASSAY ALKALINE PHOSPHATASE: CPT

## 2024-07-02 PROCEDURE — 43239 EGD BIOPSY SINGLE/MULTIPLE: CPT | Performed by: STUDENT IN AN ORGANIZED HEALTH CARE EDUCATION/TRAINING PROGRAM

## 2024-07-02 PROCEDURE — 36430 TRANSFUSION BLD/BLD COMPNT: CPT

## 2024-07-02 PROCEDURE — 93005 ELECTROCARDIOGRAM TRACING: CPT

## 2024-07-02 PROCEDURE — 2780000003 HC OR 278 NO HCPCS

## 2024-07-02 PROCEDURE — 85027 COMPLETE CBC AUTOMATED: CPT

## 2024-07-02 PROCEDURE — 2500000005 HC RX 250 GENERAL PHARMACY W/O HCPCS

## 2024-07-02 PROCEDURE — 0DB78ZX EXCISION OF STOMACH, PYLORUS, VIA NATURAL OR ARTIFICIAL OPENING ENDOSCOPIC, DIAGNOSTIC: ICD-10-PCS | Performed by: STUDENT IN AN ORGANIZED HEALTH CARE EDUCATION/TRAINING PROGRAM

## 2024-07-02 PROCEDURE — P9016 RBC LEUKOCYTES REDUCED: HCPCS

## 2024-07-02 PROCEDURE — 2500000004 HC RX 250 GENERAL PHARMACY W/ HCPCS (ALT 636 FOR OP/ED): Performed by: NURSE PRACTITIONER

## 2024-07-02 PROCEDURE — 2500000004 HC RX 250 GENERAL PHARMACY W/ HCPCS (ALT 636 FOR OP/ED)

## 2024-07-02 PROCEDURE — 7100000009 HC PHASE TWO TIME - INITIAL BASE CHARGE

## 2024-07-02 PROCEDURE — 83605 ASSAY OF LACTIC ACID: CPT

## 2024-07-02 PROCEDURE — 2500000001 HC RX 250 WO HCPCS SELF ADMINISTERED DRUGS (ALT 637 FOR MEDICARE OP)

## 2024-07-02 PROCEDURE — 2500000004 HC RX 250 GENERAL PHARMACY W/ HCPCS (ALT 636 FOR OP/ED): Performed by: STUDENT IN AN ORGANIZED HEALTH CARE EDUCATION/TRAINING PROGRAM

## 2024-07-02 PROCEDURE — 82947 ASSAY GLUCOSE BLOOD QUANT: CPT

## 2024-07-02 PROCEDURE — 0753T DGTZ GLS MCRSCP SLD LEVEL IV: CPT | Mod: TC,PARLAB | Performed by: STUDENT IN AN ORGANIZED HEALTH CARE EDUCATION/TRAINING PROGRAM

## 2024-07-02 PROCEDURE — 84484 ASSAY OF TROPONIN QUANT: CPT

## 2024-07-02 PROCEDURE — XW0G886 INTRODUCTION OF MINERAL-BASED TOPICAL HEMOSTATIC AGENT INTO UPPER GI, VIA NATURAL OR ARTIFICIAL OPENING ENDOSCOPIC, NEW TECHNOLOGY GROUP 6: ICD-10-PCS | Performed by: STUDENT IN AN ORGANIZED HEALTH CARE EDUCATION/TRAINING PROGRAM

## 2024-07-02 PROCEDURE — 7100000010 HC PHASE TWO TIME - EACH INCREMENTAL 1 MINUTE

## 2024-07-02 PROCEDURE — 2500000001 HC RX 250 WO HCPCS SELF ADMINISTERED DRUGS (ALT 637 FOR MEDICARE OP): Performed by: NURSE PRACTITIONER

## 2024-07-02 PROCEDURE — C1889 IMPLANT/INSERT DEVICE, NOC: HCPCS

## 2024-07-02 PROCEDURE — 83735 ASSAY OF MAGNESIUM: CPT

## 2024-07-02 PROCEDURE — C9113 INJ PANTOPRAZOLE SODIUM, VIA: HCPCS

## 2024-07-02 PROCEDURE — 93010 ELECTROCARDIOGRAM REPORT: CPT | Performed by: INTERNAL MEDICINE

## 2024-07-02 RX ORDER — ACETAMINOPHEN 325 MG/1
650 TABLET ORAL EVERY 4 HOURS PRN
Status: DISCONTINUED | OUTPATIENT
Start: 2024-07-02 | End: 2024-07-04 | Stop reason: HOSPADM

## 2024-07-02 RX ORDER — AMLODIPINE BESYLATE 10 MG/1
10 TABLET ORAL DAILY
Status: DISCONTINUED | OUTPATIENT
Start: 2024-07-02 | End: 2024-07-04 | Stop reason: HOSPADM

## 2024-07-02 RX ORDER — POLYETHYLENE GLYCOL 3350 17 G/17G
17 POWDER, FOR SOLUTION ORAL DAILY
Status: DISCONTINUED | OUTPATIENT
Start: 2024-07-02 | End: 2024-07-04 | Stop reason: HOSPADM

## 2024-07-02 RX ORDER — SUCRALFATE 1 G/1
1 TABLET ORAL
Status: DISCONTINUED | OUTPATIENT
Start: 2024-07-02 | End: 2024-07-04 | Stop reason: HOSPADM

## 2024-07-02 RX ORDER — ATROPINE SULFATE 0.1 MG/ML
1 INJECTION INTRAVENOUS ONCE
Status: DISCONTINUED | OUTPATIENT
Start: 2024-07-02 | End: 2024-07-02

## 2024-07-02 RX ORDER — GLYCOPYRROLATE 0.2 MG/ML
INJECTION INTRAMUSCULAR; INTRAVENOUS AS NEEDED
Status: DISCONTINUED | OUTPATIENT
Start: 2024-07-02 | End: 2024-07-02

## 2024-07-02 RX ORDER — DEXTROSE 50 % IN WATER (D50W) INTRAVENOUS SYRINGE
25
Status: DISCONTINUED | OUTPATIENT
Start: 2024-07-02 | End: 2024-07-04 | Stop reason: HOSPADM

## 2024-07-02 RX ORDER — ATROPINE SULFATE 0.1 MG/ML
1 INJECTION INTRAVENOUS ONCE AS NEEDED
Status: DISCONTINUED | OUTPATIENT
Start: 2024-07-02 | End: 2024-07-04 | Stop reason: HOSPADM

## 2024-07-02 RX ORDER — INSULIN LISPRO 100 [IU]/ML
0-5 INJECTION, SOLUTION INTRAVENOUS; SUBCUTANEOUS EVERY 6 HOURS
Status: DISCONTINUED | OUTPATIENT
Start: 2024-07-02 | End: 2024-07-03

## 2024-07-02 RX ORDER — ACETAMINOPHEN 650 MG/1
650 SUPPOSITORY RECTAL EVERY 4 HOURS PRN
Status: DISCONTINUED | OUTPATIENT
Start: 2024-07-02 | End: 2024-07-04 | Stop reason: HOSPADM

## 2024-07-02 RX ORDER — SODIUM CHLORIDE, SODIUM LACTATE, POTASSIUM CHLORIDE, CALCIUM CHLORIDE 600; 310; 30; 20 MG/100ML; MG/100ML; MG/100ML; MG/100ML
125 INJECTION, SOLUTION INTRAVENOUS CONTINUOUS
Status: DISCONTINUED | OUTPATIENT
Start: 2024-07-02 | End: 2024-07-02

## 2024-07-02 RX ORDER — POTASSIUM CHLORIDE 1.5 G/1.58G
40 POWDER, FOR SOLUTION ORAL ONCE
Status: COMPLETED | OUTPATIENT
Start: 2024-07-02 | End: 2024-07-02

## 2024-07-02 RX ORDER — LIDOCAINE HYDROCHLORIDE 20 MG/ML
INJECTION, SOLUTION INFILTRATION; PERINEURAL AS NEEDED
Status: DISCONTINUED | OUTPATIENT
Start: 2024-07-02 | End: 2024-07-02

## 2024-07-02 RX ORDER — ASPIRIN 81 MG/1
81 TABLET ORAL DAILY
Status: DISCONTINUED | OUTPATIENT
Start: 2024-07-02 | End: 2024-07-04 | Stop reason: HOSPADM

## 2024-07-02 RX ORDER — ACETAMINOPHEN 160 MG/5ML
650 SOLUTION ORAL EVERY 4 HOURS PRN
Status: DISCONTINUED | OUTPATIENT
Start: 2024-07-02 | End: 2024-07-04 | Stop reason: HOSPADM

## 2024-07-02 RX ORDER — PANTOPRAZOLE SODIUM 40 MG/10ML
40 INJECTION, POWDER, LYOPHILIZED, FOR SOLUTION INTRAVENOUS ONCE
Status: COMPLETED | OUTPATIENT
Start: 2024-07-02 | End: 2024-07-02

## 2024-07-02 RX ORDER — MAGNESIUM SULFATE HEPTAHYDRATE 40 MG/ML
4 INJECTION, SOLUTION INTRAVENOUS ONCE
Status: COMPLETED | OUTPATIENT
Start: 2024-07-02 | End: 2024-07-02

## 2024-07-02 RX ORDER — CILOSTAZOL 100 MG/1
100 TABLET ORAL 2 TIMES DAILY
Status: DISCONTINUED | OUTPATIENT
Start: 2024-07-02 | End: 2024-07-04 | Stop reason: HOSPADM

## 2024-07-02 RX ORDER — ONDANSETRON HYDROCHLORIDE 2 MG/ML
4 INJECTION, SOLUTION INTRAVENOUS EVERY 4 HOURS PRN
Status: DISCONTINUED | OUTPATIENT
Start: 2024-07-02 | End: 2024-07-04 | Stop reason: HOSPADM

## 2024-07-02 RX ORDER — PANTOPRAZOLE SODIUM 40 MG/10ML
40 INJECTION, POWDER, LYOPHILIZED, FOR SOLUTION INTRAVENOUS 2 TIMES DAILY
Status: DISCONTINUED | OUTPATIENT
Start: 2024-07-02 | End: 2024-07-04 | Stop reason: HOSPADM

## 2024-07-02 RX ORDER — GABAPENTIN 300 MG/1
600 CAPSULE ORAL 3 TIMES DAILY
Status: DISCONTINUED | OUTPATIENT
Start: 2024-07-02 | End: 2024-07-04 | Stop reason: HOSPADM

## 2024-07-02 RX ORDER — DEXTROSE 50 % IN WATER (D50W) INTRAVENOUS SYRINGE
12.5
Status: DISCONTINUED | OUTPATIENT
Start: 2024-07-02 | End: 2024-07-04 | Stop reason: HOSPADM

## 2024-07-02 RX ORDER — PROPOFOL 10 MG/ML
INJECTION, EMULSION INTRAVENOUS CONTINUOUS PRN
Status: DISCONTINUED | OUTPATIENT
Start: 2024-07-02 | End: 2024-07-02

## 2024-07-02 RX ORDER — PANTOPRAZOLE SODIUM 40 MG/10ML
40 INJECTION, POWDER, LYOPHILIZED, FOR SOLUTION INTRAVENOUS 2 TIMES DAILY
Status: DISCONTINUED | OUTPATIENT
Start: 2024-07-03 | End: 2024-07-02

## 2024-07-02 RX ORDER — ISOSORBIDE MONONITRATE 30 MG/1
30 TABLET, EXTENDED RELEASE ORAL DAILY
Status: DISCONTINUED | OUTPATIENT
Start: 2024-07-02 | End: 2024-07-04 | Stop reason: HOSPADM

## 2024-07-02 SDOH — ECONOMIC STABILITY: HOUSING INSECURITY
IN THE LAST 12 MONTHS, WAS THERE A TIME WHEN YOU DID NOT HAVE A STEADY PLACE TO SLEEP OR SLEPT IN A SHELTER (INCLUDING NOW)?: NO

## 2024-07-02 SDOH — SOCIAL STABILITY: SOCIAL INSECURITY: ABUSE: ADULT

## 2024-07-02 SDOH — ECONOMIC STABILITY: HOUSING INSECURITY: IN THE LAST 12 MONTHS, HOW MANY PLACES HAVE YOU LIVED?: 1

## 2024-07-02 SDOH — ECONOMIC STABILITY: TRANSPORTATION INSECURITY
IN THE PAST 12 MONTHS, HAS THE LACK OF TRANSPORTATION KEPT YOU FROM MEDICAL APPOINTMENTS OR FROM GETTING MEDICATIONS?: NO

## 2024-07-02 SDOH — HEALTH STABILITY: MENTAL HEALTH: CURRENT SMOKER: 0

## 2024-07-02 SDOH — ECONOMIC STABILITY: INCOME INSECURITY: IN THE LAST 12 MONTHS, WAS THERE A TIME WHEN YOU WERE NOT ABLE TO PAY THE MORTGAGE OR RENT ON TIME?: YES

## 2024-07-02 SDOH — SOCIAL STABILITY: SOCIAL INSECURITY: HAS ANYONE EVER THREATENED TO HURT YOUR FAMILY OR YOUR PETS?: NO

## 2024-07-02 SDOH — SOCIAL STABILITY: SOCIAL INSECURITY: WERE YOU ABLE TO COMPLETE ALL THE BEHAVIORAL HEALTH SCREENINGS?: YES

## 2024-07-02 SDOH — ECONOMIC STABILITY: TRANSPORTATION INSECURITY
IN THE PAST 12 MONTHS, HAS LACK OF TRANSPORTATION KEPT YOU FROM MEETINGS, WORK, OR FROM GETTING THINGS NEEDED FOR DAILY LIVING?: NO

## 2024-07-02 SDOH — SOCIAL STABILITY: SOCIAL INSECURITY: ARE YOU OR HAVE YOU BEEN THREATENED OR ABUSED PHYSICALLY, EMOTIONALLY, OR SEXUALLY BY ANYONE?: NO

## 2024-07-02 SDOH — SOCIAL STABILITY: SOCIAL INSECURITY: HAVE YOU HAD ANY THOUGHTS OF HARMING ANYONE ELSE?: NO

## 2024-07-02 SDOH — SOCIAL STABILITY: SOCIAL INSECURITY: DOES ANYONE TRY TO KEEP YOU FROM HAVING/CONTACTING OTHER FRIENDS OR DOING THINGS OUTSIDE YOUR HOME?: NO

## 2024-07-02 SDOH — ECONOMIC STABILITY: INCOME INSECURITY: HOW HARD IS IT FOR YOU TO PAY FOR THE VERY BASICS LIKE FOOD, HOUSING, MEDICAL CARE, AND HEATING?: NOT VERY HARD

## 2024-07-02 SDOH — SOCIAL STABILITY: SOCIAL INSECURITY: DO YOU FEEL UNSAFE GOING BACK TO THE PLACE WHERE YOU ARE LIVING?: NO

## 2024-07-02 SDOH — HEALTH STABILITY: PHYSICAL HEALTH: ON AVERAGE, HOW MANY MINUTES DO YOU ENGAGE IN EXERCISE AT THIS LEVEL?: 30 MIN

## 2024-07-02 SDOH — SOCIAL STABILITY: SOCIAL INSECURITY: HAVE YOU HAD THOUGHTS OF HARMING ANYONE ELSE?: NO

## 2024-07-02 SDOH — HEALTH STABILITY: PHYSICAL HEALTH: ON AVERAGE, HOW MANY DAYS PER WEEK DO YOU ENGAGE IN MODERATE TO STRENUOUS EXERCISE (LIKE A BRISK WALK)?: 5 DAYS

## 2024-07-02 SDOH — SOCIAL STABILITY: SOCIAL INSECURITY: DO YOU FEEL ANYONE HAS EXPLOITED OR TAKEN ADVANTAGE OF YOU FINANCIALLY OR OF YOUR PERSONAL PROPERTY?: NO

## 2024-07-02 SDOH — SOCIAL STABILITY: SOCIAL INSECURITY: ARE THERE ANY APPARENT SIGNS OF INJURIES/BEHAVIORS THAT COULD BE RELATED TO ABUSE/NEGLECT?: NO

## 2024-07-02 ASSESSMENT — ACTIVITIES OF DAILY LIVING (ADL)
WALKS IN HOME: INDEPENDENT
GROOMING: INDEPENDENT
HEARING - LEFT EAR: FUNCTIONAL
TOILETING: INDEPENDENT
FEEDING YOURSELF: INDEPENDENT
DRESSING YOURSELF: INDEPENDENT
BATHING: INDEPENDENT
PATIENT'S MEMORY ADEQUATE TO SAFELY COMPLETE DAILY ACTIVITIES?: YES
ADEQUATE_TO_COMPLETE_ADL: YES
JUDGMENT_ADEQUATE_SAFELY_COMPLETE_DAILY_ACTIVITIES: YES
HEARING - RIGHT EAR: FUNCTIONAL

## 2024-07-02 ASSESSMENT — COGNITIVE AND FUNCTIONAL STATUS - GENERAL
TURNING FROM BACK TO SIDE WHILE IN FLAT BAD: A LITTLE
TOILETING: A LITTLE
HELP NEEDED FOR BATHING: A LITTLE
MOBILITY SCORE: 24
MOBILITY SCORE: 18
DRESSING REGULAR LOWER BODY CLOTHING: A LITTLE
DAILY ACTIVITIY SCORE: 19
STANDING UP FROM CHAIR USING ARMS: A LITTLE
PERSONAL GROOMING: A LITTLE
DRESSING REGULAR UPPER BODY CLOTHING: A LITTLE
PATIENT BASELINE BEDBOUND: NO
MOVING FROM LYING ON BACK TO SITTING ON SIDE OF FLAT BED WITH BEDRAILS: A LITTLE
MOVING TO AND FROM BED TO CHAIR: A LITTLE
CLIMB 3 TO 5 STEPS WITH RAILING: A LOT
PERSONAL GROOMING: A LITTLE
MOVING TO AND FROM BED TO CHAIR: A LITTLE
DRESSING REGULAR LOWER BODY CLOTHING: A LITTLE
TOILETING: A LITTLE
MOVING FROM LYING ON BACK TO SITTING ON SIDE OF FLAT BED WITH BEDRAILS: A LITTLE
TURNING FROM BACK TO SIDE WHILE IN FLAT BAD: A LITTLE
DAILY ACTIVITIY SCORE: 19
HELP NEEDED FOR BATHING: A LITTLE
CLIMB 3 TO 5 STEPS WITH RAILING: A LITTLE
MOBILITY SCORE: 17
WALKING IN HOSPITAL ROOM: A LITTLE
DAILY ACTIVITIY SCORE: 24
STANDING UP FROM CHAIR USING ARMS: A LITTLE
WALKING IN HOSPITAL ROOM: A LITTLE
DRESSING REGULAR UPPER BODY CLOTHING: A LITTLE

## 2024-07-02 ASSESSMENT — PAIN SCALES - GENERAL
PAINLEVEL_OUTOF10: 8
PAINLEVEL_OUTOF10: 8
PAINLEVEL_OUTOF10: 0 - NO PAIN
PAINLEVEL_OUTOF10: 5 - MODERATE PAIN
PAINLEVEL_OUTOF10: 0 - NO PAIN
PAINLEVEL_OUTOF10: 4
PAINLEVEL_OUTOF10: 0 - NO PAIN

## 2024-07-02 ASSESSMENT — PAIN - FUNCTIONAL ASSESSMENT
PAIN_FUNCTIONAL_ASSESSMENT: 0-10

## 2024-07-02 ASSESSMENT — LIFESTYLE VARIABLES
SKIP TO QUESTIONS 9-10: 1
HOW OFTEN DO YOU HAVE 6 OR MORE DRINKS ON ONE OCCASION: NEVER
HOW OFTEN DO YOU HAVE A DRINK CONTAINING ALCOHOL: MONTHLY OR LESS
HOW MANY STANDARD DRINKS CONTAINING ALCOHOL DO YOU HAVE ON A TYPICAL DAY: PATIENT DOES NOT DRINK
AUDIT-C TOTAL SCORE: 1
AUDIT-C TOTAL SCORE: 1
PRESCIPTION_ABUSE_PAST_12_MONTHS: NO
SUBSTANCE_ABUSE_PAST_12_MONTHS: NO

## 2024-07-02 ASSESSMENT — PATIENT HEALTH QUESTIONNAIRE - PHQ9
2. FEELING DOWN, DEPRESSED OR HOPELESS: NOT AT ALL
1. LITTLE INTEREST OR PLEASURE IN DOING THINGS: NOT AT ALL
SUM OF ALL RESPONSES TO PHQ9 QUESTIONS 1 & 2: 0

## 2024-07-02 ASSESSMENT — PAIN DESCRIPTION - ORIENTATION: ORIENTATION: RIGHT;LEFT

## 2024-07-02 ASSESSMENT — PAIN SCALES - WONG BAKER: WONGBAKER_NUMERICALRESPONSE: NO HURT

## 2024-07-02 ASSESSMENT — PAIN DESCRIPTION - LOCATION: LOCATION: FINGER (COMMENT WHICH ONE)

## 2024-07-02 NOTE — PROGRESS NOTES
Physical Therapy                 Therapy Communication Note    Patient Name: Vega Rowe  MRN: [id number]  Today's Date: 7/2/2024   Time:0933    Discipline: Physical Therapy    Missed Visit Reason: Missed Visit Reason:  (Pt screened from therapy.  Pt reports he has been up and moving independently in room without AD.  AMPAC from nursing 24.  Pt has no concerns for safety with his mobility for home going at this time.)    Comment:

## 2024-07-02 NOTE — ED PROVIDER NOTES
HPI   Chief Complaint   Patient presents with    Rectal Bleeding     X2 DAYS, RED BLOOD.        This is a 64-year-old male with past medical history of Buerger's disease presenting emergency department concern for rectal bleeding.  Patient states over the last several days he has been having upper abdominal pain.  He was having dark and bright red blood per rectum when he used the bathroom and tried to pass stool.  Today he seemed to be bleeding without actually needing to use the bathroom.  He did have some lightheadedness and generalized weakness over this time as well.  Denies any falls or trauma.  States his abdominal pain improved after he had a large bowel movement prior to coming in.  He does endorse taking increased amounts of ibuprofen for pain to his fingers from his Buerger's disease.  Denies any history of ulcers or abdominal bleeding in the past though he has had polyps on colonoscopies.  He otherwise denies fever/chills, nausea vomiting, chest pain, shortness of breath, back pain, urinary symptoms.        History provided by:  Patient   used: No                        [place] Coma Scale Score: 15                     Patient History   Past Medical History:   Diagnosis Date    Abnormal weight loss 12/08/2021    Weight loss, unintentional    Encounter for screening for cardiovascular disorders     Encounter for screening for cardiovascular disorders    Other specified abnormal findings of blood chemistry 12/08/2021    Elevated liver function tests    Personal history of other diseases of the musculoskeletal system and connective tissue     History of backache    Personal history of other malignant neoplasm of skin     History of basal cell carcinoma    Personal history of other mental and behavioral disorders 10/01/2016    History of depression    Personal history of other specified conditions     History of abdominal pain     Past Surgical History:   Procedure Laterality Date     LUMBAR DISCECTOMY  12/10/2012    Spinal Diskectomy    OTHER SURGICAL HISTORY  12/10/2012    Destruction Of Malignant Lesion    OTHER SURGICAL HISTORY  12/10/2012    Dermal Autograft    OTHER SURGICAL HISTORY  12/10/2012    Acellular Dermal Allograft Face     Family History   Problem Relation Name Age of Onset    Breast cancer Mother  49    Aneurysm Father          of abdominal aorta    Asthma Sister       Social History     Tobacco Use    Smoking status: Former     Current packs/day: 0.00     Average packs/day: 1 pack/day for 50.0 years (50.0 ttl pk-yrs)     Types: Cigarettes     Start date: 1973     Quit date: 2023     Years since quittin.2    Smokeless tobacco: Never   Vaping Use    Vaping status: Never Used   Substance Use Topics    Alcohol use: Yes     Alcohol/week: 10.0 standard drinks of alcohol     Types: 10 Cans of beer per week     Comment: occasional    Drug use: Never       Physical Exam   ED Triage Vitals [24 1950]   Temperature Heart Rate Respirations BP   36.5 °C (97.7 °F) 72 18 98/56      Pulse Ox Temp src Heart Rate Source Patient Position   99 % -- -- --      BP Location FiO2 (%)     -- --       Physical Exam  GEN: well appearing, no acute distress  CVS/CHEST: reg rate, nl rhythm, no murmurs/gallops/rubs  PULM: CTAB b/l no wheezes, crackles, or rhonchi   GI: Mild epigastric tenderness palpation, ND, no masses or organomegaly, soft, no guarding  : External exam with dried blood, no hemorrhoids, INDIA with dark red stool  EXT: no LE edema, necrotic fingertips  NEURO:  no focal deficits, no facial asymmetry, moving all extremities  PSYCH: AAOx3 answers questions appropriately  ED Course & MDM   Diagnoses as of 24 1512   Gastrointestinal hemorrhage, unspecified gastrointestinal hemorrhage type       Medical Decision Making  This is a 64-year-old male with past medical history of Buerger's disease presenting emergency department concern for rectal bleeding.  Patient states over  the last several days he has been having upper abdominal pain.  Patient stable upon presentation to the emergency department, no acute distress.  Vitals significant for borderline blood pressure 98/56 though this improved once brought back from the waiting room without intervention.  Patient is otherwise not tachycardic, satting well on room air.  On exam he does have signs concerning for GI bleed with dried blood at the rectum as well as dark blood upon INDIA.  Patient reportedly had bright red blood in the toilet for nursing when brought back from triage.  Patient with mild epigastric tenderness to palpation though states that the symptoms have removed.  He also has chronic findings of necrotic appearing fingertips from his Buerger's disease.  Patient mainly requesting pain for his fingertips.  With his increased ibuprofen intake initial concern is for upper GI bleed and patient to be treated with Protonix.  As he has had some bright red blood there is concern for active bleeding and CT angio to be performed.  Lower GI bleed causes also considered including polyps, diverticulosis, mass.    Patient's lab work is significant for anemia at 5.9.  Patient's vitals otherwise remained stable throughout his time in the emergency department.  CT angio of the abdomen and pelvis without signs of active bleed though did have wall thickening of the descending duodenum read as potential duodenitis versus pancreatitis.  Chronic pancreatitis findings also found.  Patient does have mild enhancement of the lower common bile duct wall and read from radiology is correlate with evidence of cholangitis.  Patient has no LFT elevations.  He does have some epigastric tenderness palpation but no right upper quadrant tenderness.  Low suspicion for cholangitis at this time.  Patient was consented for 2 units of PRBCs.  He will require admission to the hospital for further management and GI evaluation.  Patient discussed with Dr. Archibald who  will admit to stepdown.  Procedure  Critical Care    Performed by: Neo Dominguez MD  Authorized by: Neo Dominguez MD    Critical care provider statement:     Critical care time (minutes):  33    Critical care time was exclusive of:  Separately billable procedures and treating other patients    Critical care was necessary to treat or prevent imminent or life-threatening deterioration of the following conditions: GI bleed.    Critical care was time spent personally by me on the following activities:  Development of treatment plan with patient or surrogate, evaluation of patient's response to treatment, examination of patient, interpretation of cardiac output measurements, obtaining history from patient or surrogate, ordering and performing treatments and interventions, re-evaluation of patient's condition, ordering and review of radiographic studies, ordering and review of laboratory studies and review of old charts    Care discussed with: admitting provider         Neo Dominguez MD  07/02/24 0937

## 2024-07-02 NOTE — ANESTHESIA PREPROCEDURE EVALUATION
Patient: Vega Rowe    Procedure Information       Date/Time: 07/02/24 1310    Scheduled providers: Cheko Vora MD    Procedure: EGD    Location: [facility]            Relevant Problems   Anesthesia (within normal limits)      Cardiac   (+) Abnormal electrocardiogram (ECG) (EKG)   (+) Hyperlipidemia   (+) Ischemia of digits of hand   (+) Right bundle branch block   (+) Sinus bradycardia      GI   (+) Gastrointestinal hemorrhage, unspecified gastrointestinal hemorrhage type      Liver   (+) Choledocholithiasis   (+) Chronic pancreatitis (Multi)      Musculoskeletal   (+) Disc degeneration, lumbar       Clinical information reviewed:   Tobacco  Allergies  Meds   Med Hx  Surg Hx   Fam Hx          NPO Detail:  NPO/Void Status  Carbohydrate Drink Given Prior to Surgery? : N  Date of Last Liquid: 07/02/24  Time of Last Liquid: 1030  Date of Last Solid: 06/30/24  Time of Last Solid: 1500  Last Intake Type: Light meal         Physical Exam    Airway  Mallampati: II  TM distance: >3 FB  Neck ROM: full     Cardiovascular - normal exam     Dental - normal exam     Pulmonary - normal exam     Abdominal            Anesthesia Plan    History of general anesthesia?: yes  History of complications of general anesthesia?: no    ASA 3     MAC     The patient is not a current smoker.    intravenous induction   Anesthetic plan and risks discussed with patient.    Plan discussed with CRNA.

## 2024-07-02 NOTE — CARE PLAN
Problem: Skin  Goal: Participates in plan/prevention/treatment measures  Outcome: Progressing  Flowsheets (Taken 7/2/2024 1957)  Participates in plan/prevention/treatment measures: Elevate heels     Problem: Respiratory  Goal: Patent airway maintained this shift  Outcome: Progressing     Problem: Fall/Injury  Goal: Be free from injury by end of the shift  Outcome: Progressing    The patient's goals for the shift include  no bleeding    The clinical goals for the shift include patient will remain free from signs of bleeding throughout end of shift    2100: Clarified with Dr. Baker if patient should still be getting continuous LR. LR is ordered but not hooked up to the patient. Dr. Baker to discontinue LR. Patient taking in clear liquids, adequate urine output, Bps stable.   2230: Patient HR dropping as low as 36 on continuous telemetry. Dr. Baker made aware. Day team already made aware by TRACY Stewart. Patient asymptomatic. EKG obtained per order, picture sent to Samuel. Narcotics held.     Patient having uneventful shift overall, no complaints except for chronic pain in fingers. Patient Aox4, RA, independent. Patient tolerating clear liquids, continuous fluids discontinued. Patient safety maintained.

## 2024-07-02 NOTE — DISCHARGE INSTRUCTIONS
Patient Instructions after an endoscopy or colonoscopy      The anesthetics, sedatives or narcotics which were given to you today will be acting in your body for the next 24 hours, so you might feel a little sleepy or groggy.  This feeling should slowly wear off. Carefully read and follow the instructions.     You received sedation today:  - Do not drive or operate any machinery or power tools of any kind.   - No alcoholic beverages today, not even beer or wine.  - Do not make any important decisions or sign any legal documents.  - No over the counter medications that contain alcohol or that may cause drowsiness.  - Do not make any important decisions or sign any legal documents.    While it is common to experience mild to moderate abdominal distention, gas, or belching after your procedure, if any of these symptoms occur following discharge from the GI Lab or within one week of having your procedure, call the [facility] to be advised whether a visit to your nearest Urgent Care or Emergency Department is indicated.  Take this paper with you if you go.     - If you develop an allergic reaction to the medications that were given during your procedure such as difficulty breathing, rash, hives, severe nausea, vomiting or lightheadedness.- If you experience chest pain, shortness of breath, severe abdominal pain, fevers and chills.    -If you develop signs and symptoms of bleeding such as blood in your spit, if your stools turn black, tarry, or bloody    - If you have not urinated within 8 hours following your procedure.- If your IV site becomes painful, red, inflamed, or looks infected.      You underwent an endoscopy with Dr. Vora, where he repaired an ulcer in your small intestine. Please refrain from taking NSAIDs in the future. These include: Ibuprofen (Motrin), Indomethacin, Naproxen (Aleve), Diclofenac (Voltaren)    2.   Follow up with your PCP within one week of discharge and with Dr. Vora as  directed    3. You will be discharged with a prescription for iron pills. Please take one pill every other day for 3 months

## 2024-07-02 NOTE — PROGRESS NOTES
07/02/24 1056   Discharge Planning   Living Arrangements Alone   Support Systems Children   Assistance Needed Independent, patient does drive   Type of Residence Private residence  (2nd floor apartment)   Number of Stairs to Enter Residence 9   Home or Post Acute Services None   Patient expects to be discharged to: Home   Does the patient need discharge transport arranged? No     Met with patient at bedside, introduced self and role on care transitions team. Admission assessment completed with patient. Address, insurance and contact information verified. Patient lives at home. PCP is Dr. Sena, last visit was June 2024. Patient is diabetic, checks blood sugar at home, has working glucometer/supplies. Patient preference is to return home at discharge, patient has declined any home going needs. Patients daughter to transport patient home at discharge.

## 2024-07-02 NOTE — H&P
Subjective   HPI  Vega Rowe is a 64 y.o. male with past medical history of Buerger's disease complicated by BUE digital ischemia, HTN, tobacco use disorder, HLD, chronic pancreatitis, T2DM, CAD, alcohol use disorder who presented to [facility] ED on 7/1/24 with rectal bleeding. Patient mentions that for the last 3 days he has been having bloody movements. He describes that bowel movements as being black and dark red with occasional bright red drops. He mentions that during this time he had mild epigastric pain that has resolved. He mentions that for the past couple weeks he has been taking NSAIDS and tylenol to help management his pain due to the digital ischemia. He has been taking 200mg ibuprofen at least 4 times daily. He endorses lightheadedness but denies any syncope. He also endorses some chills during this period, denies any fevers. He denies any history GIB or hemorrhoids. He mentions that he has had at least 3 episodes of bloody bowel movements this week, and several instances where he noticed blood when he wiped.    In the ED, vitals notable for BP 98/56, HR 72, SpO2 99% on RA. Labs significant for K 3.4, INR 1.4, Hgb 5.9 (baseline around 11), . CTAP with contrast showed no evidence of active gastrointestinal hemorrhage, mild wall thickening of the descending duodenum adjacent to the pancreatic head (possible duodenitis or pancreatitis), findings consistent with chronic pancreatitis, mild enhancement of the lower common bile duct wall, avascular necrosis of the femoral heads without appreciable subchondral collapse. Patient given 40mg IV protonix, 4mg Zofran, and 0.5 dilaudid in the ED. Patient also transfused 2 unite pRBC in the ED.    Patient admitted to General Medicine service for evaluation and management of GI bleed.    PMH: As stated above  PSH: as stated above, back surgery  Social: Previously drank 6 drinks/day, has not drank in over a month, recent tobacco cessation this year (previously  50-pack-year history), lives with wife at home  Family: Mother - Breast Cancer  Medications/Allergies: NKDA    ROS: 12 systems reviewed and negative except otherwise noted in HPI above.    Objective   Vitals:    07/01/24 2330   BP:    Pulse:    Resp: 18   Temp:    SpO2:       Physical Exam  GEN: conversant, NAD, pale  HEENT: PERRL, EOMI, MMM OP clear, TMs clear bilaterally  NECK: supple, no cervical LAD, no carotid bruits  CV: S1, S2, regular, no murmur  PULM: CTAB  ABD: soft, NT, ND, BS+  EXT: no LE edema, necrotic fingertips  NEURO: no gross focal deficits  PSYCH: appropriate affect       Assessment/Plan   Vega Rowe is a 64 y.o. male with past medical history of Buerger's disease complicated by BUE digital ischemia, HTN, tobacco use disorder, HLD, chronic pancreatitis, T2DM, CAD, alcohol use disorder who presented to [facility] ED on 7/1/24 with rectal bleeding. Patient admitted to General Medicine service for evaluation and management of GI bleed.    #GIB, suspect upper in setting of NSAID use  ERCP 05/2023 showed normal upper GI tract; Colonoscopy 10/2019 showed polyps in sigmoid colon, granularity at the anus, non-bleeding internal hemorrhoids  - Transfuse for Hgb > 7; received 2 units pRBC on 7/1  - Patient hemodynamically stable, hold antihypertensives and aspirin  - Give 80mg IV Protonix now, continue 40mg BID tomorrow  - GI consulted for possible EGD  - Maintain type and screen 7/1  - Continue maintenance fluids  - Anemia likely 2/2 acute blood loss, however will check folate and B12 levels given macrocytosis    Chronic Conditions:  #T2DM: Lispro sliding scale  #Buerger's Disease: Hold home 100mg cilostazol BID and 30mg imdur  #HTN: Hold home 10mg amlodipine  #Chronic pain: Continue 600mg gabapentin TID, PRN 0.4 dilaudid IV Q4h for severe    DVT: Held in setting of bleed, SCDs  Diet: NPO  IVF:  ml/hr  Antibiotics: None  Consults: GI  Disposition: Stepdown/Intermediate    CODE: Full Code    This is a  preliminary note, please await attending attestation for a finalized plan.    Samuel Longoria MD  Internal Medicine PGY3  Please message me with any questions

## 2024-07-02 NOTE — CARE PLAN
The patient's goals for the shift include      The clinical goals for the shift include Increase in H/H    Over the shift, the patient did not make progress toward the following goals. Barriers to progression include patient is scheduled for EGD today.

## 2024-07-02 NOTE — ANESTHESIA POSTPROCEDURE EVALUATION
Patient: Vega Rowe    Procedure Summary       Date: 07/02/24 Room / Location: [facility]    Anesthesia Start: 1306 Anesthesia Stop: 1343    Procedure: EGD Diagnosis: Gastrointestinal hemorrhage, unspecified gastrointestinal hemorrhage type    Scheduled Providers: Cheko Vora MD Responsible Provider: Chucky Escobedo MD    Anesthesia Type: MAC ASA Status: 3            Anesthesia Type: MAC    Vitals Value Taken Time   BP 95/52 07/02/24 1345   Temp 36.6 °C (97.9 °F) 07/02/24 1340   Pulse 59 07/02/24 1358   Resp 21 07/02/24 1358   SpO2 93 % 07/02/24 1358   Vitals shown include unfiled device data.    Anesthesia Post Evaluation    Patient location during evaluation: PACU  Patient participation: complete - patient participated  Level of consciousness: awake and alert  Pain management: adequate  Airway patency: patent  Cardiovascular status: acceptable  Respiratory status: acceptable  Hydration status: acceptable  Postoperative Nausea and Vomiting: none        No notable events documented.

## 2024-07-02 NOTE — PROGRESS NOTES
Occupational Therapy                 Therapy Communication Note    Patient Name: Vega Rowe  MRN: [id number]  Today's Date: 7/2/2024     Discipline: Occupational Therapy    Missed Visit Reason:  Chart reviewed and case conference with RN.  Patient seen bedside in room 826 at 9:32 am for OT screen as patient at baseline function indep.  No further OT skilled needs.  Plan:  Discharge patient from OT; patient agreed.      Missed Time: Attempt

## 2024-07-02 NOTE — CARE PLAN
Problem: Fall/Injury  Goal: Not fall by end of shift  Outcome: Met  Goal: Be free from injury by end of the shift  Outcome: Met  Goal: Verbalize understanding of personal risk factors for fall in the hospital  Outcome: Met  Goal: Verbalize understanding of risk factor reduction measures to prevent injury from fall in the home  Outcome: Met  Goal: Use assistive devices by end of the shift  Outcome: Met  Goal: Pace activities to prevent fatigue by end of the shift  Outcome: Met   The patient's goals for the shift include      The clinical goals for the shift include Increase in H/H and remain hemodynamically stable.

## 2024-07-02 NOTE — PERIOPERATIVE NURSING NOTE
Met with mother to go over discharge breastfeeding booklet including the feeding log  Emphasized 8 or more (12) feedings in a 24 hour period, what to expect for the number of diapers per day of life and the progression of properties of the  stooling pattern  Reviewed breastfeeding and your lifestyle, storage and preparation of breast milk, how to keep you breast pump clean, the employed breastfeeding mother and paced bottle feeding handouts  Booklet included Breastfeeding Resources for after discharge including access to the number for the [address]  Mother verbalized breastfeeding is going well  Enc to call for assistance as needed,phone # given  REPORT CALLED TO NURSE BILL

## 2024-07-02 NOTE — CONSULTS
"Reason For Consult    GI bleed, UGIB vs hemorrhoidal, patient takes NSAIDS for chronic pain    History Of Present Illness  Vega Rowe is a 64 y.o. male  with past medical history of Buerger's disease complicated by BUE digital ischemia, HTN, tobacco use disorder, HLD, chronic pancreatitis, T2DM, CAD, alcohol use disorder who presented to [facility] ED on 7/1/24 with rectal bleeding for the last 3 days. He describes BMs as being black and dark red with occasional bright red drops.    On examination patient reports recently having significant epigastric discomfort, especially in the last couple weeks.  He has been using large doses of ibuprofen for digital pain \"for a long time\".  Was using baking soda and water last couple weeks with only a brief relief.  Since 3 days ago he had 3 large loose bloody BMs as above.  After coming to the ER still had \"bloody drips\" but no more melenic episodes and epigastric discomfort has also c calmed down.    Denies any previous history of upper or lower GI bleed.    Unsure of previous EGDs, none in our records     Colonoscopy 10/22/2019 by Dr. 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  ERCP 3/3/2022 for ampullary adenoma, biopsies taken, biliary tree swept, nothing found, 1 plastic biliary stent placed to CBD, ampullary adenoma, no high-grade dysplasia or malignancy  ERCP 9/28/2021 for chronic pancreatitis follow-up, biliary stent removal, biliary tree swept, nothing found 1 partially occluded stent in CBD, removed major papilla appeared " congested, biopsied, endosonographic imaging of pancreas showed changes consistent with moderate to severe chronic pancreatitis with marked pancreatic ductal dilation and intraductal calculi appreciated, low-grade adenoma like dysplasia on biopsy  ERCP 9/27/2021 congested, engorged major papilla, sphincterotomy performed, single localized biliary stricture in the lower third of the main bile duct, dilated, brushed for cytology, stent placed    Laboratory data today shows H&H 7.5 after 2 units of RBCs given in the ER for H&H 5.9 on admission, MCV 99, no WBC elevation, INR 1.4, CMP essentially unremarkable except serum calcium 7.9 and albumin 3.0    CTA of abdomen and pelvis from yesterday, 7/1/2024 showed no evidence of active GI hemorrhage, mild wall thickening of the descending and duodenum adjacent to pancreatic head, marked atrophy and calcifications of the pancreas consistent with chronic pancreatitis, mild enhancement of the lower common bile duct wall, please see full official report for additional findings     PMH: As stated above  PSH: as stated above, back surgery  Social: Previously drank 6 drinks/day, has not drank in over a month, recent tobacco cessation this year (previously 50-pack-year history), lives with wife at home  Family: Mother - Breast Cancer  Medications/Allergies: NKDA  Past Medical History  He has a past medical history of Abnormal weight loss (12/08/2021), Encounter for screening for cardiovascular disorders, Other specified abnormal findings of blood chemistry (12/08/2021), Personal history of other diseases of the musculoskeletal system and connective tissue, Personal history of other malignant neoplasm of skin, Personal history of other mental and behavioral disorders (10/01/2016), and Personal history of other specified conditions.    Surgical History  He has a past surgical history that includes Other surgical history (12/10/2012); Other surgical history (12/10/2012); Lumbar  "discectomy (12/10/2012); and Other surgical history (12/10/2012).     Social History  He reports that he quit smoking about 15 months ago. His smoking use included cigarettes. He started smoking about 51 years ago. He has a 50 pack-year smoking history. He has never used smokeless tobacco. He reports current alcohol use of about 10.0 standard drinks of alcohol per week. He reports that he does not use drugs.    Family History  Family History   Problem Relation Name Age of Onset    Breast cancer Mother  49    Aneurysm Father          of abdominal aorta    Asthma Sister          Allergies  Patient has no known allergies.    Review of Systems     A 10 point review of system is negative except for what is mentioned in the HPI    Physical Exam    The note was created using voice recognition transcription software. Despite proofreading, unintentional typographical errors may be present. Please contact the GI office with any questions or concerns.     Current Medications: reviewed    Vital Signs: Reviewed    Physical Exam:  General: no apparent distress, pleasant and cooperative  Skin:  Warm and dry, no jaundice  HEENT: No scleral icterus, no conjunctival pallor, normocephalic, atraumatic, mucous membranes moist  Neck:  atraumatic, trachea midline, no JVD  Chest:  decreased air entry to auscultation bilaterally. No wheezes, rales, or rhonchi  CV:  Regular rate and rhythm.  Positive S1/S2  Abdomen: no distension, +BS, soft, non-tender to palpation, no rebound tenderness, no guarding, no rigidity, no discernible ascites   Extremities: no lower extremity edema, Chronic pigmentary changes, no cyanosis, necrotic fingertips  Neurological:  A&Ox3 , no asterixis  Psychiatric: cooperative     Investigations:  Labs, radiological imaging and cardiac work up were reviewed     Last Recorded Vitals  Blood pressure 155/67, pulse 63, temperature 36.1 °C (97 °F), temperature source Temporal, resp. rate 18, height 1.905 m (6' 3\"), weight " 79.5 kg (175 lb 4.3 oz), SpO2 92%.    Relevant Results      Scheduled medications  [Held by provider] amLODIPine, 10 mg, oral, Daily  [Held by provider] aspirin, 81 mg, oral, Daily  [Held by provider] cilostazol, 100 mg, oral, BID  gabapentin, 600 mg, oral, TID  insulin lispro, 0-5 Units, subcutaneous, q6h  [Held by provider] isosorbide mononitrate ER, 30 mg, oral, Daily  pantoprazole, 40 mg, intravenous, BID  perflutren lipid microspheres, 3 mL of dilution, intravenous, Once in imaging  perflutren protein A microsphere, 0.5 mL, intravenous, Once in imaging  polyethylene glycol, 17 g, oral, Daily  sulfur hexafluoride microsphr, 2 mL, intravenous, Once in imaging      Continuous medications  lactated Ringer's, 125 mL/hr, Last Rate: 125 mL/hr (07/02/24 1306)      PRN medications  PRN medications: acetaminophen **OR** acetaminophen **OR** acetaminophen, dextrose, dextrose, glucagon, glucagon, HYDROmorphone, ondansetron    Results for orders placed or performed during the hospital encounter of 07/01/24 (from the past 24 hour(s))   CBC and Auto Differential   Result Value Ref Range    WBC 7.4 4.4 - 11.3 x10*3/uL    nRBC 0.0 0.0 - 0.0 /100 WBCs    RBC 1.65 (L) 4.50 - 5.90 x10*6/uL    Hemoglobin 5.9 (LL) 13.5 - 17.5 g/dL    Hematocrit 18.0 (L) 41.0 - 52.0 %     (H) 80 - 100 fL    MCH 35.8 (H) 26.0 - 34.0 pg    MCHC 32.8 32.0 - 36.0 g/dL    RDW 11.8 11.5 - 14.5 %    Platelets 291 150 - 450 x10*3/uL    Neutrophils % 68.5 40.0 - 80.0 %    Immature Granulocytes %, Automated 0.5 0.0 - 0.9 %    Lymphocytes % 19.1 13.0 - 44.0 %    Monocytes % 7.0 2.0 - 10.0 %    Eosinophils % 3.8 0.0 - 6.0 %    Basophils % 1.1 0.0 - 2.0 %    Neutrophils Absolute 5.10 1.20 - 7.70 x10*3/uL    Immature Granulocytes Absolute, Automated 0.04 0.00 - 0.70 x10*3/uL    Lymphocytes Absolute 1.42 1.20 - 4.80 x10*3/uL    Monocytes Absolute 0.52 0.10 - 1.00 x10*3/uL    Eosinophils Absolute 0.28 0.00 - 0.70 x10*3/uL    Basophils Absolute 0.08 0.00 -  0.10 x10*3/uL   Comprehensive metabolic panel   Result Value Ref Range    Glucose 194 (H) 74 - 99 mg/dL    Sodium 137 136 - 145 mmol/L    Potassium 3.4 (L) 3.5 - 5.3 mmol/L    Chloride 101 98 - 107 mmol/L    Bicarbonate 26 21 - 32 mmol/L    Anion Gap 13 10 - 20 mmol/L    Urea Nitrogen 11 6 - 23 mg/dL    Creatinine 0.83 0.50 - 1.30 mg/dL    eGFR >90 >60 mL/min/1.73m*2    Calcium 8.3 (L) 8.6 - 10.3 mg/dL    Albumin 3.2 (L) 3.4 - 5.0 g/dL    Alkaline Phosphatase 87 33 - 136 U/L    Total Protein 5.9 (L) 6.4 - 8.2 g/dL    AST 13 9 - 39 U/L    Bilirubin, Total 0.3 0.0 - 1.2 mg/dL    ALT 22 10 - 52 U/L   Type and Screen   Result Value Ref Range    ABO TYPE O     Rh TYPE POS     ANTIBODY SCREEN NEG    Coagulation Screen   Result Value Ref Range    Protime 16.1 (H) 9.8 - 12.8 seconds    INR 1.4 (H) 0.9 - 1.1    aPTT 34 27 - 38 seconds   Lipase   Result Value Ref Range    Lipase <3 (L) 9 - 82 U/L   Morphology   Result Value Ref Range    RBC Morphology See Below     Polychromasia Mild     Hypochromia Mild    Magnesium   Result Value Ref Range    Magnesium 1.51 (L) 1.60 - 2.40 mg/dL   Phosphorus   Result Value Ref Range    Phosphorus 2.9 2.5 - 4.9 mg/dL   Prepare RBC: 2 Units   Result Value Ref Range    PRODUCT CODE W4251O60     Unit Number [id number]     Unit ABO O     Unit RH POS     XM INTEP COMP     Dispense Status IS     Blood Expiration Date July 30, 2024 23:59 EDT     PRODUCT BLOOD TYPE 5100     UNIT VOLUME 350     PRODUCT CODE H3749C25     Unit Number [id number]     Unit ABO O     Unit RH POS     XM INTEP COMP     Dispense Status IS     Blood Expiration Date July 31, 2024 23:59 EDT     PRODUCT BLOOD TYPE 5100     UNIT VOLUME 350    Lavender Top   Result Value Ref Range    Extra Tube Hold for add-ons.    PST Top   Result Value Ref Range    Extra Tube Hold for add-ons.    VERIFY ABO/Rh Group Test   Result Value Ref Range    ABO TYPE O     Rh TYPE POS    POCT GLUCOSE   Result Value Ref Range    POCT Glucose 119  (H) 74 - 99 mg/dL   CBC and Auto Differential   Result Value Ref Range    WBC 5.4 4.4 - 11.3 x10*3/uL    nRBC 0.0 0.0 - 0.0 /100 WBCs    RBC 2.19 (L) 4.50 - 5.90 x10*6/uL    Hemoglobin 7.4 (L) 13.5 - 17.5 g/dL    Hematocrit 22.0 (L) 41.0 - 52.0 %     (H) 80 - 100 fL    MCH 33.8 26.0 - 34.0 pg    MCHC 33.6 32.0 - 36.0 g/dL    RDW 16.5 (H) 11.5 - 14.5 %    Platelets 246 150 - 450 x10*3/uL    Neutrophils % 63.1 40.0 - 80.0 %    Immature Granulocytes %, Automated 0.7 0.0 - 0.9 %    Lymphocytes % 22.5 13.0 - 44.0 %    Monocytes % 8.9 2.0 - 10.0 %    Eosinophils % 3.7 0.0 - 6.0 %    Basophils % 1.1 0.0 - 2.0 %    Neutrophils Absolute 3.39 1.20 - 7.70 x10*3/uL    Immature Granulocytes Absolute, Automated 0.04 0.00 - 0.70 x10*3/uL    Lymphocytes Absolute 1.21 1.20 - 4.80 x10*3/uL    Monocytes Absolute 0.48 0.10 - 1.00 x10*3/uL    Eosinophils Absolute 0.20 0.00 - 0.70 x10*3/uL    Basophils Absolute 0.06 0.00 - 0.10 x10*3/uL   Comprehensive Metabolic Panel   Result Value Ref Range    Glucose 113 (H) 74 - 99 mg/dL    Sodium 138 136 - 145 mmol/L    Potassium 3.7 3.5 - 5.3 mmol/L    Chloride 104 98 - 107 mmol/L    Bicarbonate 27 21 - 32 mmol/L    Anion Gap 11 10 - 20 mmol/L    Urea Nitrogen 9 6 - 23 mg/dL    Creatinine 0.61 0.50 - 1.30 mg/dL    eGFR >90 >60 mL/min/1.73m*2    Calcium 7.9 (L) 8.6 - 10.3 mg/dL    Albumin 3.0 (L) 3.4 - 5.0 g/dL    Alkaline Phosphatase 81 33 - 136 U/L    Total Protein 5.5 (L) 6.4 - 8.2 g/dL    AST 11 9 - 39 U/L    Bilirubin, Total 0.4 0.0 - 1.2 mg/dL    ALT 18 10 - 52 U/L   Magnesium   Result Value Ref Range    Magnesium 1.61 1.60 - 2.40 mg/dL   Lactate   Result Value Ref Range    Lactate 0.6 0.4 - 2.0 mmol/L   Folate   Result Value Ref Range    Folate, Serum 20.0 >5.0 ng/mL   Vitamin B12   Result Value Ref Range    Vitamin B12 336 211 - 911 pg/mL   POCT GLUCOSE   Result Value Ref Range    POCT Glucose 111 (H) 74 - 99 mg/dL   CBC   Result Value Ref Range    WBC 6.5 4.4 - 11.3 x10*3/uL    nRBC  0.0 0.0 - 0.0 /100 WBCs    RBC 2.23 (L) 4.50 - 5.90 x10*6/uL    Hemoglobin 7.5 (L) 13.5 - 17.5 g/dL    Hematocrit 22.0 (L) 41.0 - 52.0 %    MCV 99 80 - 100 fL    MCH 33.6 26.0 - 34.0 pg    MCHC 34.1 32.0 - 36.0 g/dL    RDW 17.2 (H) 11.5 - 14.5 %    Platelets 238 150 - 450 x10*3/uL   POCT GLUCOSE   Result Value Ref Range    POCT Glucose 103 (H) 74 - 99 mg/dL          Assessment/Plan     Vega Rowe is a 64 y.o. male  with past medical history of Buerger's disease complicated by BUE digital ischemia, HTN, tobacco use disorder, HLD, chronic pancreatitis, T2DM, CAD, alcohol use disorder who presented to [facility] ED on 7/1/24 with rectal bleeding for the last 3 days.    He describes rectal bleed as large loose but not watery bloody BMs with dark red color and clots x 3, accompanied night by epigastric discomfort in the setting of large daily ibuprofen intake.    Most likely patient might be experiencing bleeding upper GI ulcers/erosions    Continue n.p.o.  Continue Protonix 40 mg IV twice daily  Daily abdominal exams, serial CBC, active type and screen on file, 2 large-bore IV access, replenish if below 7  EGD today    Patient discussed with Dr. Vora  Will follow      I spent 60 minutes in the professional and overall care of this patient.      Pricilla Young, APRN-CNP

## 2024-07-02 NOTE — NURSING NOTE
Dr. Juárez made aware that the patient had an episode of bradycardia, with a rate of late 30's to mid 40's. The patient is asymptomatic. No new orders obtained.

## 2024-07-02 NOTE — CONSULTS
Consults      64-year-old gentleman with history of digital ischemia hypertension dyslipidemia chronic pancreatitis diabetes alcohol use presenting with few day history of epigastric pain and dark stools.  Patient mentions ibuprofen intake because of digital ischemia.    EGD 5/2023 unremarkable  Family history reviewed, not pertinent to chief complaint  Reported alcohol abuse in the past, positive NSAIDs intake, denies marijuana drug use, ex-smoker          The note was created using voice recognition transcription software. Despite proofreading, unintentional typographical errors may be present. Please contact the GI office with any questions or concerns.     Current Medications: reviewed    A 10 point review of system is negative except for what is mentioned in the HPI    Follow up with GI was advised       Vital Signs: Reviewed    Physical Exam:  General: no apparent distress, pleasant and cooperative  Skin:  Warm and dry, no jaundice  HEENT: No scleral icterus, no conjunctival pallor, normocephalic, atraumatic, mucous membranes moist  Neck:  atraumatic, trachea midline, no JVD  Chest:  decreased air entry to auscultation bilaterally. No wheezes, rales, or rhonchi  CV:  Regular rate and rhythm.  Positive S1/S2  Abdomen: no distension, +BS, soft, non-tender to palpation, no rebound tenderness, no guarding, no rigidity, no discernible ascites   Extremities: Digital ischemia noted, lower extremity edema, Chronic pigmentary changes, no cyanosis  Neurological:  A&Ox3 , no asterixis  Psychiatric: cooperative     Investigations:  Labs, radiological imaging and cardiac work up were reviewed    1-abdominal pain and dark stools with drop in hemoglobin in the setting of daily NSAIDs intake, suggestive of upper GI bleed, keep active type and screen, serial CBC, 2 18-gauge IV, transfuse as needed, Protonix twice a day, iron supplementation, will proceed with EGD

## 2024-07-03 ENCOUNTER — APPOINTMENT (OUTPATIENT)
Dept: CARDIOLOGY | Facility: HOSPITAL | Age: 64
DRG: 378 | End: 2024-07-03
Payer: MEDICARE

## 2024-07-03 LAB
ANION GAP SERPL CALC-SCNC: 11 MMOL/L (ref 10–20)
BLOOD EXPIRATION DATE: NORMAL
BLOOD EXPIRATION DATE: NORMAL
BODY SURFACE AREA: 2.05 M2
BUN SERPL-MCNC: 7 MG/DL (ref 6–23)
CALCIUM SERPL-MCNC: 8.2 MG/DL (ref 8.6–10.3)
CARDIAC TROPONIN I PNL SERPL HS: 9 NG/L (ref 0–20)
CHLORIDE SERPL-SCNC: 103 MMOL/L (ref 98–107)
CO2 SERPL-SCNC: 26 MMOL/L (ref 21–32)
CREAT SERPL-MCNC: 0.73 MG/DL (ref 0.5–1.3)
DISPENSE STATUS: NORMAL
DISPENSE STATUS: NORMAL
EGFRCR SERPLBLD CKD-EPI 2021: >90 ML/MIN/1.73M*2
ERYTHROCYTE [DISTWIDTH] IN BLOOD BY AUTOMATED COUNT: 17.1 % (ref 11.5–14.5)
GLUCOSE BLD MANUAL STRIP-MCNC: 113 MG/DL (ref 74–99)
GLUCOSE BLD MANUAL STRIP-MCNC: 137 MG/DL (ref 74–99)
GLUCOSE BLD MANUAL STRIP-MCNC: 189 MG/DL (ref 74–99)
GLUCOSE BLD MANUAL STRIP-MCNC: 90 MG/DL (ref 74–99)
GLUCOSE SERPL-MCNC: 93 MG/DL (ref 74–99)
HCT VFR BLD AUTO: 24.4 % (ref 41–52)
HGB BLD-MCNC: 8.2 G/DL (ref 13.5–17.5)
MCH RBC QN AUTO: 33.6 PG (ref 26–34)
MCHC RBC AUTO-ENTMCNC: 33.6 G/DL (ref 32–36)
MCV RBC AUTO: 100 FL (ref 80–100)
NRBC BLD-RTO: 0 /100 WBCS (ref 0–0)
PLATELET # BLD AUTO: 299 X10*3/UL (ref 150–450)
POTASSIUM SERPL-SCNC: 4 MMOL/L (ref 3.5–5.3)
PRODUCT BLOOD TYPE: 5100
PRODUCT BLOOD TYPE: 5100
PRODUCT CODE: NORMAL
PRODUCT CODE: NORMAL
RBC # BLD AUTO: 2.44 X10*6/UL (ref 4.5–5.9)
SODIUM SERPL-SCNC: 136 MMOL/L (ref 136–145)
UNIT ABO: NORMAL
UNIT ABO: NORMAL
UNIT NUMBER: NORMAL
UNIT NUMBER: NORMAL
UNIT RH: NORMAL
UNIT RH: NORMAL
UNIT VOLUME: 350
UNIT VOLUME: 350
WBC # BLD AUTO: 6.4 X10*3/UL (ref 4.4–11.3)
XM INTEP: NORMAL
XM INTEP: NORMAL

## 2024-07-03 PROCEDURE — 99223 1ST HOSP IP/OBS HIGH 75: CPT | Performed by: INTERNAL MEDICINE

## 2024-07-03 PROCEDURE — 36415 COLL VENOUS BLD VENIPUNCTURE: CPT

## 2024-07-03 PROCEDURE — 2500000004 HC RX 250 GENERAL PHARMACY W/ HCPCS (ALT 636 FOR OP/ED): Performed by: NURSE PRACTITIONER

## 2024-07-03 PROCEDURE — 93248 EXT ECG>7D<15D REV&INTERPJ: CPT | Performed by: INTERNAL MEDICINE

## 2024-07-03 PROCEDURE — 2500000001 HC RX 250 WO HCPCS SELF ADMINISTERED DRUGS (ALT 637 FOR MEDICARE OP): Performed by: NURSE PRACTITIONER

## 2024-07-03 PROCEDURE — 2500000004 HC RX 250 GENERAL PHARMACY W/ HCPCS (ALT 636 FOR OP/ED): Performed by: STUDENT IN AN ORGANIZED HEALTH CARE EDUCATION/TRAINING PROGRAM

## 2024-07-03 PROCEDURE — 2500000002 HC RX 250 W HCPCS SELF ADMINISTERED DRUGS (ALT 637 FOR MEDICARE OP, ALT 636 FOR OP/ED)

## 2024-07-03 PROCEDURE — C9113 INJ PANTOPRAZOLE SODIUM, VIA: HCPCS | Performed by: NURSE PRACTITIONER

## 2024-07-03 PROCEDURE — 2500000004 HC RX 250 GENERAL PHARMACY W/ HCPCS (ALT 636 FOR OP/ED)

## 2024-07-03 PROCEDURE — 93005 ELECTROCARDIOGRAM TRACING: CPT

## 2024-07-03 PROCEDURE — 85027 COMPLETE CBC AUTOMATED: CPT

## 2024-07-03 PROCEDURE — 93010 ELECTROCARDIOGRAM REPORT: CPT | Performed by: INTERNAL MEDICINE

## 2024-07-03 PROCEDURE — 80048 BASIC METABOLIC PNL TOTAL CA: CPT

## 2024-07-03 PROCEDURE — 99233 SBSQ HOSP IP/OBS HIGH 50: CPT

## 2024-07-03 PROCEDURE — 82947 ASSAY GLUCOSE BLOOD QUANT: CPT

## 2024-07-03 PROCEDURE — 1200000002 HC GENERAL ROOM WITH TELEMETRY DAILY

## 2024-07-03 PROCEDURE — 99232 SBSQ HOSP IP/OBS MODERATE 35: CPT | Performed by: STUDENT IN AN ORGANIZED HEALTH CARE EDUCATION/TRAINING PROGRAM

## 2024-07-03 PROCEDURE — 93246 EXT ECG>7D<15D RECORDING: CPT

## 2024-07-03 RX ORDER — INSULIN LISPRO 100 [IU]/ML
0-5 INJECTION, SOLUTION INTRAVENOUS; SUBCUTANEOUS
Status: DISCONTINUED | OUTPATIENT
Start: 2024-07-03 | End: 2024-07-04 | Stop reason: HOSPADM

## 2024-07-03 ASSESSMENT — PAIN SCALES - GENERAL
PAINLEVEL_OUTOF10: 0 - NO PAIN
PAINLEVEL_OUTOF10: 0 - NO PAIN

## 2024-07-03 ASSESSMENT — COGNITIVE AND FUNCTIONAL STATUS - GENERAL
MOVING TO AND FROM BED TO CHAIR: A LITTLE
MOBILITY SCORE: 18
DRESSING REGULAR UPPER BODY CLOTHING: A LITTLE
DAILY ACTIVITIY SCORE: 19
MOVING FROM LYING ON BACK TO SITTING ON SIDE OF FLAT BED WITH BEDRAILS: A LITTLE
PERSONAL GROOMING: A LITTLE
TOILETING: A LITTLE
HELP NEEDED FOR BATHING: A LITTLE
MOBILITY SCORE: 18
CLIMB 3 TO 5 STEPS WITH RAILING: A LITTLE
WALKING IN HOSPITAL ROOM: A LITTLE
STANDING UP FROM CHAIR USING ARMS: A LITTLE
PERSONAL GROOMING: A LITTLE
HELP NEEDED FOR BATHING: A LITTLE
CLIMB 3 TO 5 STEPS WITH RAILING: A LITTLE
DRESSING REGULAR LOWER BODY CLOTHING: A LITTLE
DRESSING REGULAR LOWER BODY CLOTHING: A LITTLE
TURNING FROM BACK TO SIDE WHILE IN FLAT BAD: A LITTLE
DRESSING REGULAR UPPER BODY CLOTHING: A LITTLE
TOILETING: A LITTLE
MOVING TO AND FROM BED TO CHAIR: A LITTLE
WALKING IN HOSPITAL ROOM: A LITTLE
STANDING UP FROM CHAIR USING ARMS: A LITTLE
MOVING FROM LYING ON BACK TO SITTING ON SIDE OF FLAT BED WITH BEDRAILS: A LITTLE
DAILY ACTIVITIY SCORE: 19
TURNING FROM BACK TO SIDE WHILE IN FLAT BAD: A LITTLE

## 2024-07-03 NOTE — CONSULTS
Consults  History Of Present Illness:    Vega Rowe is a 64 y.o. male presenting with anemia.    The patient is a 64-year-old male patient of Dr. Prince and Dr. Newberry with a history of coronary artery disease based on coronary calcification, Buerger's disease, Raynaud's phenomenon, hypertension, hyperlipidemia, diabetes, alcohol use who presents with rectal bleeding.  He was noted to take NSAIDs to improve his discomfort from his hands.  He denies any chest discomfort, shortness of breath, palpitations, lightheadedness, syncope, orthopnea, PND, lower extremity edema.  Patient is noted to have significant bradycardia on telemetry.  Cardiology is consulted.    Twelve-lead ECG demonstrates sinus rhythm with first-degree AV block, incomplete right bundle branch block.  Telemetry reveals sinus bradycardia with heart rates in the 30s.  Some of this occurs at night while he sleeping.  Other times this occurs in the afternoon.  In the recent past, the patient has refused echocardiogram as well as noninvasive stress testing.     Last Recorded Vitals:  Vitals:    07/02/24 2339 07/03/24 0407 07/03/24 0801 07/03/24 1144   BP: 134/63 132/67 154/72 145/69   BP Location: Right arm Right arm Right arm Right arm   Patient Position: Lying Lying Lying Lying   Pulse: (!) 43 50 60 51   Resp: 18 18 16 16   Temp: 36.2 °C (97.2 °F) 36.8 °C (98.2 °F) 36.2 °C (97.2 °F) 36.3 °C (97.3 °F)   TempSrc: Temporal Temporal Temporal Temporal   SpO2: 91% 92% 95% 90%   Weight:       Height:           Last Labs:  CBC - 7/3/2024:  5:31 AM  6.4 8.2 299    24.4      CMP - 7/3/2024:  5:31 AM  8.2 5.5 11 --- 0.4   2.9 3.0 18 81      PTT - 7/1/2024:  8:49 PM  1.4   16.1 34     Troponin I, High Sensitivity   Date/Time Value Ref Range Status   07/02/2024 11:23 PM 9 0 - 20 ng/L Final   02/11/2024 01:25 PM 5 0 - 20 ng/L Final     Hemoglobin A1C   Date/Time Value Ref Range Status   05/16/2024 07:52 AM 6.7 (H) see below % Final   06/07/2023 11:41 AM 6.0 (A) %  Final     Comment:          Diagnosis of Diabetes-Adults   Non-Diabetic: < or = 5.6%   Increased risk for developing diabetes: 5.7-6.4%   Diagnostic of diabetes: > or = 6.5%  .       Monitoring of Diabetes                Age (y)     Therapeutic Goal (%)   Adults:          >18           <7.0   Pediatrics:    13-18           <7.5                   7-12           <8.0                   0- 6            7.5-8.5   American Diabetes Association. Diabetes Care 33(S1), Jan 2010.   12/08/2021 08:47 AM 6.9 (A) % Final     Comment:          Diagnosis of Diabetes-Adults   Non-Diabetic: < or = 5.6%   Increased risk for developing diabetes: 5.7-6.4%   Diagnostic of diabetes: > or = 6.5%  .       Monitoring of Diabetes                Age (y)     Therapeutic Goal (%)   Adults:          >18           <7.0   Pediatrics:    13-18           <7.5                   7-12           <8.0                   0- 6            7.5-8.5   American Diabetes Association. Diabetes Care 33(S1), Jan 2010.       LDL Calculated   Date/Time Value Ref Range Status   05/16/2024 07:52 AM 25 <=99 mg/dL Final     Comment:                                 Near   Borderline      AGE      Desirable  Optimal    High     High     Very High     0-19 Y     0 - 109     ---    110-129   >/= 130     ----    20-24 Y     0 - 119     ---    120-159   >/= 160     ----      >24 Y     0 -  99   100-129  130-159   160-189     >/=190       VLDL   Date/Time Value Ref Range Status   05/16/2024 07:52 AM 14 0 - 40 mg/dL Final   06/07/2023 11:41 AM 13 0 - 40 mg/dL Final   06/29/2022 09:09 AM 8 0 - 40 mg/dL Final   12/08/2021 08:47 AM 14 0 - 40 mg/dL Final      Last I/O:  I/O last 3 completed shifts:  In: 2280 (28.7 mL/kg) [I.V.:1030 (13 mL/kg); Blood:1000; IV Piggyback:250]  Out: 2210 (27.8 mL/kg) [Urine:2210 (0.8 mL/kg/hr)]  Weight: 79.5 kg     Past Cardiology Tests (Last 3 Years):  EKG:  ECG 12 lead 07/02/2024 (Preliminary)      ECG 12 lead 02/11/2024      ECG 12 lead  "06/07/2023    Echo:  No results found for this or any previous visit from the past 1095 days.    Ejection Fractions:  No results found for: \"EF\"  Cath:  No results found for this or any previous visit from the past 1095 days.    Stress Test:  No results found for this or any previous visit from the past 1095 days.    Cardiac Imaging:  No results found for this or any previous visit from the past 1095 days.      Past Medical History:  He has a past medical history of Abnormal weight loss (12/08/2021), Encounter for screening for cardiovascular disorders, Other specified abnormal findings of blood chemistry (12/08/2021), Personal history of other diseases of the musculoskeletal system and connective tissue, Personal history of other malignant neoplasm of skin, Personal history of other mental and behavioral disorders (10/01/2016), and Personal history of other specified conditions.    Past Surgical History:  He has a past surgical history that includes Other surgical history (12/10/2012); Other surgical history (12/10/2012); Lumbar discectomy (12/10/2012); and Other surgical history (12/10/2012).      Social History:  He reports that he quit smoking about 15 months ago. His smoking use included cigarettes. He started smoking about 51 years ago. He has a 50 pack-year smoking history. He has never used smokeless tobacco. He reports current alcohol use of about 10.0 standard drinks of alcohol per week. He reports that he does not use drugs.    Family History:  Family History   Problem Relation Name Age of Onset    Breast cancer Mother  49    Aneurysm Father          of abdominal aorta    Asthma Sister          Allergies:  Patient has no known allergies.    Inpatient Medications:  Scheduled medications   Medication Dose Route Frequency    [Held by provider] amLODIPine  10 mg oral Daily    [Held by provider] aspirin  81 mg oral Daily    [Held by provider] cilostazol  100 mg oral BID    [Held by provider] gabapentin  600 mg " oral TID    insulin lispro  0-5 Units subcutaneous Before meals & nightly    iron sucrose  300 mg intravenous Daily    [Held by provider] isosorbide mononitrate ER  30 mg oral Daily    pantoprazole  40 mg intravenous BID    perflutren lipid microspheres  3 mL of dilution intravenous Once in imaging    perflutren protein A microsphere  0.5 mL intravenous Once in imaging    polyethylene glycol  17 g oral Daily    sucralfate  1 g oral Before meals & nightly    sulfur hexafluoride microsphr  2 mL intravenous Once in imaging     PRN medications   Medication    acetaminophen    Or    acetaminophen    Or    acetaminophen    atropine    dextrose    dextrose    glucagon    glucagon    [Held by provider] HYDROmorphone    ondansetron     Continuous Medications   Medication Dose Last Rate     Outpatient Medications:  Current Outpatient Medications   Medication Instructions    acetaminophen (TYLENOL) 325 mg, oral, Every 6 hours PRN    amLODIPine (NORVASC) 10 mg, oral, Daily    aspirin 81 mg, oral, Daily    cilostazol (PLETAL) 100 mg, oral, 2 times daily    gabapentin (NEURONTIN) 600 mg, oral, 3 times daily    ibuprofen 600 mg, oral, 3 times daily    isosorbide mononitrate ER (IMDUR) 30 mg, oral, Daily, Do not crush or chew.    nitroglycerin (Nitro-Bid) 2 % ointment 0.5 inches, transdermal, Every 6 hours scheduled       Physical Exam:  Physical Exam  Vitals reviewed.   Constitutional:       Appearance: Normal appearance.   HENT:      Head: Normocephalic and atraumatic.      Mouth/Throat:      Mouth: Mucous membranes are moist.      Pharynx: Oropharynx is clear.   Eyes:      Extraocular Movements: Extraocular movements intact.      Conjunctiva/sclera: Conjunctivae normal.   Cardiovascular:      Rate and Rhythm: Normal rate and regular rhythm.      Pulses: Normal pulses.      Heart sounds: Normal heart sounds.   Pulmonary:      Effort: Pulmonary effort is normal.      Breath sounds: Normal breath sounds.   Abdominal:      General:  Bowel sounds are normal.      Palpations: Abdomen is soft.   Musculoskeletal:         General: No swelling.      Cervical back: Neck supple.   Skin:     General: Skin is warm and dry.   Neurological:      General: No focal deficit present.      Mental Status: He is alert.   Psychiatric:         Mood and Affect: Mood normal.         Behavior: Behavior normal.           Assessment/Plan   7/3/2024  1.  Asymptomatic bradycardia: Patient with sinus bradycardia with first-degree AV block.  He is known to have bradycardia in the past.  He is not on any AV marjorie blockade.  24-hour Holter monitor was unremarkable.  Telemetry reveals sinus bradycardia with heart rates in the 30s.  He is asymptomatic from a cardiac standpoint.   Given the asymptomatic nature of the patient, likely not a candidate for pacemaker implantation at this time.  Did give the patient precautions to call us including chest pain, shortness of breath, lightheadedness.  If the patient becomes symptomatic, he would likely be a candidate for pacemaker implantation.    Would recommend 14-day Holter monitor for further evaluation of arrhythmias, which I have ordered.    Would continue to refrain from using any AV marjorie blocking agents.    Did not order echocardiogram since he refused this in the past.    Patient can follow-up with Dr. Prince after discharge.    Peripheral IV 07/01/24 18 G Left Antecubital (Active)   Site Assessment Clean;Dry;Intact 07/03/24 0818   Dressing Status Clean;Dry 07/03/24 0818   Number of days: 2       Peripheral IV 07/02/24 20 G Distal;Right;Upper;Anterior Arm (Active)   Site Assessment Clean;Dry;Intact 07/03/24 0817   Dressing Status Clean;Dry 07/03/24 0817   Number of days: 1       Code Status:  Full Code    Laureano Long MD

## 2024-07-03 NOTE — PROGRESS NOTES
7/3/2024 patient seen and examined, denies melena hematochezia or hematemesis        The note was created using voice recognition transcription software. Despite proofreading, unintentional typographical errors may be present. Please contact the GI office with any questions or concerns.      Current Medications: reviewed     A 10 point review of system is negative except for what is mentioned in the HPI     Follow up with GI was advised         Vital Signs: Reviewed     Physical Exam:  General: no apparent distress, pleasant and cooperative  Skin:  Warm and dry, no jaundice  HEENT: No scleral icterus, no conjunctival pallor, normocephalic, atraumatic, mucous membranes moist  Neck:  atraumatic, trachea midline, no JVD  Chest:  decreased air entry to auscultation bilaterally. No wheezes, rales, or rhonchi  CV:  Regular rate and rhythm.  Positive S1/S2  Abdomen: no distension, +BS, soft, non-tender to palpation, no rebound tenderness, no guarding, no rigidity, no discernible ascites   Extremities: Digital ischemia noted, lower extremity edema, Chronic pigmentary changes, no cyanosis  Neurological:  A&Ox3 , no asterixis  Psychiatric: cooperative      Investigations:  Labs, radiological imaging and cardiac work up were reviewed        64-year-old gentleman with history of digital ischemia hypertension dyslipidemia chronic pancreatitis diabetes alcohol use presenting with few day history of epigastric pain and dark stools.  Patient mentions ibuprofen intake because of digital ischemia.     EGD 5/2023 unremarkable  EGD 7/2/2024 medium hiatal hernia, mild esophagitis, mild gastritis, large duodenal ulcer status post epinephrine and Hemospray used  Family history reviewed, not pertinent to chief complaint  Reported alcohol abuse in the past, positive NSAIDs intake, denies marijuana drug use, ex-smoker            1-abdominal pain and dark stools with drop in hemoglobin in the setting of daily NSAIDs intake, suggestive of  upper GI bleed, status post EGD on 7/2/2024 as above showing large duodenal ulcer, keep active type and screen, serial CBC, 2 18-gauge IV, transfuse as needed, Protonix twice a day, iron supplementation, avoid NSAIDs

## 2024-07-03 NOTE — PROGRESS NOTES
Vega Rowe is a 64 y.o. male on day 1 of admission presenting with Gastrointestinal hemorrhage, unspecified gastrointestinal hemorrhage type. GI following, s/p EGD 7/2 with findings of one large duodenal ulcer.       Subjective   Seen and examined this morning. Reports one small, dark bowel movement this morning, no ulisses blood noticed. Denies abdominal pain, nausea, vomiting, shortness of breath.       Objective     Last Recorded Vitals  /67 (BP Location: Right arm, Patient Position: Lying)   Pulse 50   Temp 36.8 °C (98.2 °F) (Temporal)   Resp 18   Wt 79.5 kg (175 lb 4.3 oz)   SpO2 92%   Intake/Output last 3 Shifts:    Intake/Output Summary (Last 24 hours) at 7/3/2024 0654  Last data filed at 7/3/2024 0407  Gross per 24 hour   Intake 1280 ml   Output 1810 ml   Net -530 ml       Admission Weight  Weight: 86.6 kg (190 lb 14.7 oz) (07/01/24 1950)    Daily Weight  07/02/24 : 79.5 kg (175 lb 4.3 oz)    Image Results  Esophagogastroduodenoscopy (EGD)  Table formatting from the original result was not included.  Impression  Medium hiatal hernia  Abnormal mucosa  Single ulcer in the 1st part of the duodenum with clean base (Chriss   III); performed cold forceps biopsy; injected epinephrine to address   bleeding; hemostasis achieved; applied hemostatic spray to control   bleeding; hemostasis achieved    Findings  Z-line 41 cm from the incisors  Medium hiatal hernia without Dylan lesions present - GE junction 43 cm   from the incisors, diaphragmatic impression 45 cm from the incisors. Hill   grade III hiatal hernia  Mild erythematous mucosa in the GE junction; performed cold forceps biopsy   to rule out Johnston's esophagus  Abnormal mucosa in the upper third of the esophagus. Inlet patch in the   upper esophagus  Mild erythematous mucosa with erosion in the body of the stomach and   antrum; performed cold forceps biopsy to rule out H. pylori  Single large deep ulcer in the 1st part of the duodenum with  clean base   (Chriss III); performed cold forceps biopsy; injected epinephrine to   address bleeding; hemostasis achieved; applied hemostatic spray to control   bleeding; hemostasis achieved    Recommendation   Await pathology results    Protonix q12 hrs  Carafate q6 hrs  Avoid NSAIDs        Indication  Gastrointestinal hemorrhage, unspecified gastrointestinal hemorrhage type    Staff  Staff Role   Cheko Vora MD Proceduralist     Medications  See Anesthesia Record.     Preprocedure  A history and physical has been performed, and patient medication   allergies have been reviewed. The patient's tolerance of previous   anesthesia has been reviewed. The risks and benefits of the procedure and   the sedation options and risks were discussed with the patient. All   questions were answered and informed consent obtained.    Details of the Procedure  The patient underwent monitored anesthesia care, which was administered by   an anesthesia professional. The patient's blood pressure, ECG, ETCO2,   heart rate, level of consciousness, oxygen and respirations were monitored   throughout the procedure. The scope was introduced through the mouth and   advanced to the second part of the duodenum. Retroflexion was performed in   the cardia. The patient experienced no blood loss. The procedure was not   difficult. The patient tolerated the procedure well. There were no   apparent adverse events.     Events  Procedure Events   Event Event Time   ENDO SCOPE IN TIME 7/2/2024  1:16 PM   ENDO SCOPE OUT TIME 7/2/2024  1:35 PM     Specimens  ID Type Source Tests Collected by Time   1 : COLD BX'S DUODINAL ULCER Tissue DUODENUM FIRST PART BIOPSY SURGICAL   PATHOLOGY EXAM Cheko Vora MD 7/2/2024 1326   2 : COLD BX'S GASTRIC ANTRUM Tissue STOMACH ANTRUM BIOPSY SURGICAL   PATHOLOGY EXAM Cheko Vora MD 7/2/2024 1327   3 : COLD BX'S Tissue ESOPHAGOGASTRIC JUNCTION BIOPSY SURGICAL PATHOLOGY   EXAM Cheko Vora MD 7/2/2024 1332     Procedure  Location  [facility] [address]  [address]  [facility] [id number] [phone]    Referring Provider  ANNI Parks-CNP    Procedure Provider  Cheko Vora MD      Physical Exam  Vitals reviewed.   Constitutional:       Appearance: Normal appearance.   Cardiovascular:      Rate and Rhythm: Bradycardia present.   Pulmonary:      Effort: Pulmonary effort is normal.      Breath sounds: Normal breath sounds.   Abdominal:      General: Abdomen is flat.      Palpations: Abdomen is soft.   Musculoskeletal:      Right lower leg: No edema.      Left lower leg: No edema.   Skin:     General: Skin is warm and dry.   Neurological:      General: No focal deficit present.      Mental Status: He is alert and oriented to person, place, and time.         Relevant Results  Results for orders placed or performed during the hospital encounter of 07/01/24 (from the past 24 hour(s))   CBC   Result Value Ref Range    WBC 6.5 4.4 - 11.3 x10*3/uL    nRBC 0.0 0.0 - 0.0 /100 WBCs    RBC 2.23 (L) 4.50 - 5.90 x10*6/uL    Hemoglobin 7.5 (L) 13.5 - 17.5 g/dL    Hematocrit 22.0 (L) 41.0 - 52.0 %    MCV 99 80 - 100 fL    MCH 33.6 26.0 - 34.0 pg    MCHC 34.1 32.0 - 36.0 g/dL    RDW 17.2 (H) 11.5 - 14.5 %    Platelets 238 150 - 450 x10*3/uL   POCT GLUCOSE   Result Value Ref Range    POCT Glucose 103 (H) 74 - 99 mg/dL   POCT GLUCOSE   Result Value Ref Range    POCT Glucose 104 (H) 74 - 99 mg/dL   POCT GLUCOSE   Result Value Ref Range    POCT Glucose 108 (H) 74 - 99 mg/dL   POCT GLUCOSE   Result Value Ref Range    POCT Glucose 110 (H) 74 - 99 mg/dL   Troponin I, High Sensitivity   Result Value Ref Range    Troponin I, High Sensitivity 9 0 - 20 ng/L   CBC   Result Value Ref Range    WBC 6.4 4.4 - 11.3 x10*3/uL    nRBC 0.0 0.0 - 0.0 /100 WBCs    RBC 2.44 (L) 4.50 - 5.90 x10*6/uL    Hemoglobin 8.2 (L) 13.5 - 17.5 g/dL    Hematocrit 24.4 (L) 41.0 - 52.0 %     80 - 100 fL    MCH 33.6 26.0 - 34.0 pg    MCHC  33.6 32.0 - 36.0 g/dL    RDW 17.1 (H) 11.5 - 14.5 %    Platelets 299 150 - 450 x10*3/uL   Basic Metabolic Panel   Result Value Ref Range    Glucose 93 74 - 99 mg/dL    Sodium 136 136 - 145 mmol/L    Potassium 4.0 3.5 - 5.3 mmol/L    Chloride 103 98 - 107 mmol/L    Bicarbonate 26 21 - 32 mmol/L    Anion Gap 11 10 - 20 mmol/L    Urea Nitrogen 7 6 - 23 mg/dL    Creatinine 0.73 0.50 - 1.30 mg/dL    eGFR >90 >60 mL/min/1.73m*2    Calcium 8.2 (L) 8.6 - 10.3 mg/dL   POCT GLUCOSE   Result Value Ref Range    POCT Glucose 90 74 - 99 mg/dL                Assessment/Plan   This patient currently has cardiac telemetry ordered; if you would like to modify or discontinue the telemetry order, click here to go to the orders activity to modify/discontinue the order.  Principal Problem:    Gastrointestinal hemorrhage, unspecified gastrointestinal hemorrhage type    Vega Rowe is a 64 y.o. male with past medical history of Buerger's disease complicated by BUE digital ischemia, HTN, tobacco use disorder, HLD, chronic pancreatitis, T2DM, CAD, alcohol use disorder who presented to [facility] ED on 7/1/24 with rectal bleeding. Patient admitted to General Medicine service for evaluation and management of GI bleed.     #GIB, suspect upper in setting of NSAID use  #Acute macrocytic anemia  ERCP 05/2023 showed normal upper GI tract; Colonoscopy 10/2019 showed polyps in sigmoid colon, granularity at the anus, non-bleeding internal hemorrhoids  - B12/Folate within normal limits  - Patient hemodynamically stable, hold antihypertensives and aspirin  - s/p EGD 7-2-2024 with single large deep ulcer in the duodenum  - Continue PPI BID and Carafate q6 per GI, IV iron for 3 days per GI  - Transfuse for Hgb > 7; received 2 units pRBC on 7/1  - Maintain type and screen 7/1    Chronic Conditions:  #T2DM: Lispro sliding scale  #Buerger's Disease: Hold home 100mg cilostazol BID and 30mg imdur  #HTN: Hold home 10mg amlodipine  #1st degree AV block: With  episodes of asymptomatic bradycardia into 30s. EKG and Cardio consult pending  #Chronic pain: Continue 600mg gabapentin TID, PRN 0.4 dilaudid IV Q4h for severe      Valentín Juárez MD  PGY-1, Internal Medicine

## 2024-07-04 VITALS
RESPIRATION RATE: 18 BRPM | DIASTOLIC BLOOD PRESSURE: 69 MMHG | HEART RATE: 58 BPM | BODY MASS INDEX: 21.79 KG/M2 | SYSTOLIC BLOOD PRESSURE: 135 MMHG | TEMPERATURE: 97.7 F | WEIGHT: 175.27 LBS | OXYGEN SATURATION: 96 % | HEIGHT: 75 IN

## 2024-07-04 LAB
ERYTHROCYTE [DISTWIDTH] IN BLOOD BY AUTOMATED COUNT: 16.4 % (ref 11.5–14.5)
GLUCOSE BLD MANUAL STRIP-MCNC: 127 MG/DL (ref 74–99)
GLUCOSE BLD MANUAL STRIP-MCNC: 163 MG/DL (ref 74–99)
HCT VFR BLD AUTO: 25.3 % (ref 41–52)
HGB BLD-MCNC: 8.4 G/DL (ref 13.5–17.5)
HOLD SPECIMEN: NORMAL
MCH RBC QN AUTO: 33.6 PG (ref 26–34)
MCHC RBC AUTO-ENTMCNC: 33.2 G/DL (ref 32–36)
MCV RBC AUTO: 101 FL (ref 80–100)
NRBC BLD-RTO: 0 /100 WBCS (ref 0–0)
PLATELET # BLD AUTO: 287 X10*3/UL (ref 150–450)
RBC # BLD AUTO: 2.5 X10*6/UL (ref 4.5–5.9)
WBC # BLD AUTO: 6.7 X10*3/UL (ref 4.4–11.3)

## 2024-07-04 PROCEDURE — 2500000001 HC RX 250 WO HCPCS SELF ADMINISTERED DRUGS (ALT 637 FOR MEDICARE OP): Performed by: NURSE PRACTITIONER

## 2024-07-04 PROCEDURE — 2500000004 HC RX 250 GENERAL PHARMACY W/ HCPCS (ALT 636 FOR OP/ED): Performed by: STUDENT IN AN ORGANIZED HEALTH CARE EDUCATION/TRAINING PROGRAM

## 2024-07-04 PROCEDURE — 99232 SBSQ HOSP IP/OBS MODERATE 35: CPT | Performed by: INTERNAL MEDICINE

## 2024-07-04 PROCEDURE — 85027 COMPLETE CBC AUTOMATED: CPT

## 2024-07-04 PROCEDURE — C9113 INJ PANTOPRAZOLE SODIUM, VIA: HCPCS | Performed by: NURSE PRACTITIONER

## 2024-07-04 PROCEDURE — 36415 COLL VENOUS BLD VENIPUNCTURE: CPT

## 2024-07-04 PROCEDURE — 82947 ASSAY GLUCOSE BLOOD QUANT: CPT

## 2024-07-04 PROCEDURE — 2500000004 HC RX 250 GENERAL PHARMACY W/ HCPCS (ALT 636 FOR OP/ED): Performed by: NURSE PRACTITIONER

## 2024-07-04 RX ORDER — PANTOPRAZOLE SODIUM 40 MG/1
40 TABLET, DELAYED RELEASE ORAL 2 TIMES DAILY
Qty: 60 TABLET | Refills: 2 | Status: SHIPPED | OUTPATIENT
Start: 2024-07-04 | End: 2024-10-02

## 2024-07-04 RX ORDER — FERROUS SULFATE 325(65) MG
325 TABLET, DELAYED RELEASE (ENTERIC COATED) ORAL EVERY OTHER DAY
Qty: 15 TABLET | Refills: 0 | Status: SHIPPED | OUTPATIENT
Start: 2024-07-04 | End: 2024-08-03

## 2024-07-04 NOTE — PROGRESS NOTES
Subjective Data:  Denies any chest discomfort or shortness of breath.    Overnight Events:    Sinus bradycardia overnight.     Objective Data:  Last Recorded Vitals:  Vitals:    07/03/24 2333 07/04/24 0402 07/04/24 0753 07/04/24 1117   BP: 107/61 120/68 145/67 135/69   BP Location: Right arm Right arm     Patient Position: Lying Lying     Pulse: 53 52     Resp: 18 18     Temp: 36.4 °C (97.5 °F) 36.6 °C (97.9 °F)     TempSrc: Temporal Temporal     SpO2: 95% 96%     Weight:       Height:           Last Labs:  CBC - 7/4/2024:  5:12 AM  6.7 8.4 287    25.3      CMP - 7/3/2024:  5:31 AM  8.2 5.5 11 --- 0.4   2.9 3.0 18 81      PTT - 7/1/2024:  8:49 PM  1.4   16.1 34     TROPHS   Date/Time Value Ref Range Status   07/02/2024 11:23 PM 9 0 - 20 ng/L Final   02/11/2024 01:25 PM 5 0 - 20 ng/L Final     HGBA1C   Date/Time Value Ref Range Status   05/16/2024 07:52 AM 6.7 see below % Final   06/07/2023 11:41 AM 6.0 % Final     Comment:          Diagnosis of Diabetes-Adults   Non-Diabetic: < or = 5.6%   Increased risk for developing diabetes: 5.7-6.4%   Diagnostic of diabetes: > or = 6.5%  .       Monitoring of Diabetes                Age (y)     Therapeutic Goal (%)   Adults:          >18           <7.0   Pediatrics:    13-18           <7.5                   7-12           <8.0                   0- 6            7.5-8.5   American Diabetes Association. Diabetes Care 33(S1), Jan 2010.   12/08/2021 08:47 AM 6.9 % Final     Comment:          Diagnosis of Diabetes-Adults   Non-Diabetic: < or = 5.6%   Increased risk for developing diabetes: 5.7-6.4%   Diagnostic of diabetes: > or = 6.5%  .       Monitoring of Diabetes                Age (y)     Therapeutic Goal (%)   Adults:          >18           <7.0   Pediatrics:    13-18           <7.5                   7-12           <8.0                   0- 6            7.5-8.5   American Diabetes Association. Diabetes Care 33(S1), Jan 2010.       LDLCALC   Date/Time Value Ref Range Status  "  05/16/2024 07:52 AM 25 <=99 mg/dL Final     Comment:                                 Near   Borderline      AGE      Desirable  Optimal    High     High     Very High     0-19 Y     0 - 109     ---    110-129   >/= 130     ----    20-24 Y     0 - 119     ---    120-159   >/= 160     ----      >24 Y     0 -  99   100-129  130-159   160-189     >/=190       VLDL   Date/Time Value Ref Range Status   05/16/2024 07:52 AM 14 0 - 40 mg/dL Final   06/07/2023 11:41 AM 13 0 - 40 mg/dL Final   06/29/2022 09:09 AM 8 0 - 40 mg/dL Final   12/08/2021 08:47 AM 14 0 - 40 mg/dL Final      Last I/O:  I/O last 3 completed shifts:  In: 1250 (15.7 mL/kg) [I.V.:1000 (12.6 mL/kg); IV Piggyback:250]  Out: 1650 (20.8 mL/kg) [Urine:1650 (0.6 mL/kg/hr)]  Weight: 79.5 kg     Past Cardiology Tests (Last 3 Years):  EKG:  ECG 12 lead 07/02/2024 (Preliminary)      ECG 12 lead 02/11/2024      ECG 12 lead 06/07/2023    Echo:  No results found for this or any previous visit from the past 1095 days.    Ejection Fractions:  No results found for: \"EF\"  Cath:  No results found for this or any previous visit from the past 1095 days.    Stress Test:  No results found for this or any previous visit from the past 1095 days.    Cardiac Imaging:  No results found for this or any previous visit from the past 1095 days.      Inpatient Medications:  Scheduled medications   Medication Dose Route Frequency    [Held by provider] amLODIPine  10 mg oral Daily    [Held by provider] aspirin  81 mg oral Daily    [Held by provider] cilostazol  100 mg oral BID    [Held by provider] gabapentin  600 mg oral TID    insulin lispro  0-5 Units subcutaneous Before meals & nightly    [Held by provider] isosorbide mononitrate ER  30 mg oral Daily    pantoprazole  40 mg intravenous BID    perflutren lipid microspheres  3 mL of dilution intravenous Once in imaging    perflutren protein A microsphere  0.5 mL intravenous Once in imaging    polyethylene glycol  17 g oral Daily    " sucralfate  1 g oral Before meals & nightly    sulfur hexafluoride microsphr  2 mL intravenous Once in imaging     PRN medications   Medication    acetaminophen    Or    acetaminophen    Or    acetaminophen    atropine    dextrose    dextrose    glucagon    glucagon    [Held by provider] HYDROmorphone    ondansetron     Continuous Medications   Medication Dose Last Rate       Physical Exam:  Physical Exam  Vitals reviewed.   Constitutional:       Appearance: Normal appearance.   HENT:      Head: Normocephalic and atraumatic.      Mouth/Throat:      Mouth: Mucous membranes are moist.      Pharynx: Oropharynx is clear.   Eyes:      Extraocular Movements: Extraocular movements intact.      Conjunctiva/sclera: Conjunctivae normal.   Cardiovascular:      Rate and Rhythm: Normal rate and regular rhythm.      Pulses: Normal pulses.      Heart sounds: Normal heart sounds.   Pulmonary:      Effort: Pulmonary effort is normal.      Breath sounds: Normal breath sounds.   Abdominal:      General: Bowel sounds are normal.      Palpations: Abdomen is soft.   Musculoskeletal:         General: No swelling.      Cervical back: Neck supple.   Skin:     General: Skin is warm and dry.   Neurological:      General: No focal deficit present.      Mental Status: He is alert.   Psychiatric:         Mood and Affect: Mood normal.         Behavior: Behavior normal.           Assessment/Plan   7/3/2024  1.  Asymptomatic bradycardia: Patient with sinus bradycardia with first-degree AV block.  He is known to have bradycardia in the past.  He is not on any AV marjorie blockade.  24-hour Holter monitor was unremarkable.  Telemetry reveals sinus bradycardia with heart rates in the 30s.  He is asymptomatic from a cardiac standpoint.   Given the asymptomatic nature of the patient, likely not a candidate for pacemaker implantation at this time.  Did give the patient precautions to call us including chest pain, shortness of breath, lightheadedness.  If  the patient becomes symptomatic, he would likely be a candidate for pacemaker implantation.     Would recommend 14-day Holter monitor for further evaluation of arrhythmias, which I have ordered.     Would continue to refrain from using any AV marjorie blocking agents.     Did not order echocardiogram since he refused this in the past.     Patient can follow-up with Dr. Prince after discharge.    7/4/2024  1.  Asymptomatic bradycardia: Currently wearing 14-day Holter monitor.  Asymptomatic from a cardiac standpoint.  Continue holding any AV marjorie blocking agents.  Patient is otherwise stable for discharge from a cardiac standpoint.  Patient can follow-up with Dr. Prince after his monitor is complete.    Peripheral IV 07/01/24 18 G Left Antecubital (Active)   Site Assessment Clean;Dry;Intact 07/04/24 0900   Dressing Status Dry;Clean 07/04/24 0900   Number of days: 3       Peripheral IV 07/02/24 20 G Distal;Right;Upper;Anterior Arm (Active)   Site Assessment Clean;Dry;Intact 07/04/24 0900   Dressing Status Clean;Dry 07/04/24 0900   Number of days: 2       Code Status:  Full Code    Laureano Long MD

## 2024-07-04 NOTE — DISCHARGE SUMMARY
Discharge Diagnosis  Gastrointestinal hemorrhage, unspecified gastrointestinal hemorrhage type  Duodenal ulcer    Issues Requiring Follow-Up  Repeat CBC in 1 week with PCP  Follow-up with GI for ulcer healing    Discharge Meds     Your medication list        ASK your doctor about these medications        Instructions Last Dose Given Next Dose Due   acetaminophen 325 mg tablet  Commonly known as: Tylenol           amLODIPine 10 mg tablet  Commonly known as: Norvasc      Take 1 tablet (10 mg) by mouth once daily.       aspirin 81 mg EC tablet           cilostazol 100 mg tablet  Commonly known as: Pletal      Take 1 tablet (100 mg) by mouth 2 times a day.       gabapentin 600 mg tablet  Commonly known as: Neurontin      Take 1 tablet (600 mg) by mouth 3 times a day.       ibuprofen 600 mg tablet           isosorbide mononitrate ER 30 mg 24 hr tablet  Commonly known as: Imdur      Take 1 tablet (30 mg) by mouth once daily. Do not crush or chew.       Nitro-Bid 2 % ointment  Generic drug: nitroglycerin      Place 0.5 inches on the skin every 6 hours.                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  On initial evaluation in the ED his blood pressure was borderline at 98/56 and his hemoglobin was found to be 5.9.  He was given 2 units of packed red blood cells emergently and GI was consulted for suspected upper GI bleed in the setting of his chronic NSAID use.  Patient underwent EGD on 7/2/2024 with findings significant for 1 single large deep ulcer in the first part of the duodenum which was injected with epinephrine to  address bleeding.  Patient did well post procedure and his hemoglobin remained stable for 2 days.  There were also concerns for heart block and cardiology was consulted and an EKG was performed with findings significant for first-degree heart block cardiology recommended discharge with Holter monitor for 2 weeks and follow-up outpatient.  He will be discharged with oral iron for 1 month, PPI twice daily for 3 months and will follow-up with his PCP in 1 week and GI as directed.    Pertinent Physical Exam At Time of Discharge  Physical Exam  Vitals reviewed.   Constitutional:       Appearance: Normal appearance.   HENT:      Head: Normocephalic and atraumatic.   Cardiovascular:      Rate and Rhythm: Normal rate and regular rhythm.   Pulmonary:      Effort: Pulmonary effort is normal.      Breath sounds: Normal breath sounds.   Abdominal:      General: Abdomen is flat.      Palpations: Abdomen is soft.   Skin:     General: Skin is warm and dry.      Findings: Lesion (necrotic fingertips in b/l UE) present.   Neurological:      General: No focal deficit present.      Mental Status: He is alert and oriented to person, place, and time.         Outpatient Follow-Up  Future Appointments   Date Time Provider Department Center   7/10/2024  9:30 AM ANNI Gonsales-CNP [id number] [place]   9/10/2024 10:15 AM Dash Prince MD [id number] [place]   11/26/2024 10:00 AM Kathleen Sena MD [id number] [place]         Valentín Juárez MD

## 2024-07-04 NOTE — CARE PLAN
Problem: Skin  Goal: Decreased wound size/increased tissue granulation at next dressing change  Outcome: Progressing  Goal: Participates in plan/prevention/treatment measures  Outcome: Progressing  Goal: Prevent/manage excess moisture  Outcome: Progressing  Goal: Prevent/minimize sheer/friction injuries  Outcome: Progressing  Goal: Promote/optimize nutrition  Outcome: Progressing  Goal: Promote skin healing  Outcome: Progressing     Problem: Respiratory  Goal: Clear secretions with interventions this shift  Outcome: Progressing  Goal: Minimize anxiety/maximize coping throughout shift  Outcome: Progressing  Goal: Minimal/no exertional discomfort or dyspnea this shift  Outcome: Progressing  Goal: No signs of respiratory distress (eg. Use of accessory muscles. Peds grunting)  Outcome: Progressing  Goal: Patent airway maintained this shift  Outcome: Progressing  Goal: Tolerate mechanical ventilation evidenced by VS/agitation level this shift  Outcome: Progressing  Goal: Tolerate pulmonary toileting this shift  Outcome: Progressing  Goal: Verbalize decreased shortness of breath this shift  Outcome: Progressing  Goal: Wean oxygen to maintain O2 saturation per order/standard this shift  Outcome: Progressing  Goal: Increase self care and/or family involvement in next 24 hours  Outcome: Progressing     Problem: Fall/Injury  Goal: Not fall by end of shift  Outcome: Progressing  Goal: Be free from injury by end of the shift  Outcome: Progressing  Goal: Verbalize understanding of personal risk factors for fall in the hospital  Outcome: Progressing  Goal: Verbalize understanding of risk factor reduction measures to prevent injury from fall in the home  Outcome: Progressing  Goal: Use assistive devices by end of the shift  Outcome: Progressing  Goal: Pace activities to prevent fatigue by end of the shift  Outcome: Progressing   The patient's goals for the shift include      The clinical goals for the shift include Patient will  remain comfortable for shift    Over the shift, the patient did not make progress toward the following goals. Barriers to progression include . Recommendations to address these barriers include .

## 2024-07-05 ENCOUNTER — PATIENT OUTREACH (OUTPATIENT)
Dept: CARE COORDINATION | Facility: CLINIC | Age: 64
End: 2024-07-05
Payer: MEDICARE

## 2024-07-05 LAB
ATRIAL RATE: 300 BPM
ATRIAL RATE: 52 BPM
P AXIS: 24 DEGREES
P OFFSET: 152 MS
P OFFSET: 166 MS
P ONSET: 112 MS
P ONSET: 97 MS
PR INTERVAL: 230 MS
Q ONSET: 206 MS
Q ONSET: 227 MS
QRS COUNT: 8 BEATS
QRS COUNT: 9 BEATS
QRS DURATION: 100 MS
QRS DURATION: 98 MS
QT INTERVAL: 466 MS
QT INTERVAL: 502 MS
QTC CALCULATION(BAZETT): 433 MS
QTC CALCULATION(BAZETT): 444 MS
QTC FREDERICIA: 444 MS
QTC FREDERICIA: 462 MS
R AXIS: -41 DEGREES
R AXIS: -49 DEGREES
T AXIS: 16 DEGREES
T AXIS: 7 DEGREES
T OFFSET: 457 MS
T OFFSET: 460 MS
VENTRICULAR RATE: 47 BPM
VENTRICULAR RATE: 52 BPM

## 2024-07-05 NOTE — PROGRESS NOTES
Discharge Facility: [facility]  Discharge Diagnosis: GI BLEED  Admission Date: 7/1/24  Discharge Date:  7/4/24    PCP Appointment Date: TBD no appts within 14 days for hosp fu  Specialist Appointment Date: 7/10 cardiology  TBD Dr Vora GI  Hospital Encounter and Summary Linked: Yes  See discharge assessment below for further details  Engagement  Call Start Time: 1530 (7/5/2024  3:34 PM)    Medications  Medications reviewed with patient/caregiver?: Yes (7/5/2024  3:34 PM)  Is the patient having any side effects they believe may be caused by any medication additions or changes?: No (7/5/2024  3:34 PM)  Does the patient have all medications ordered at discharge?: Yes (7/5/2024  3:34 PM)  Care Management Interventions: Provided patient education (7/5/2024  3:34 PM)  Prescription Comments: No NSAIDS, take oral iron for one month, take pantoprazole twice daily (7/5/2024  3:34 PM)  Is the patient taking all medications as directed (includes completed medication regime)?: Yes (7/5/2024  3:34 PM)  Care Management Interventions: Provided patient education (7/5/2024  3:34 PM)  Medication Comments: No questions on medications (7/5/2024  3:34 PM)    Appointments  Does the patient have a primary care provider?: Yes (7/5/2024  3:34 PM)  Care Management Interventions: -- (Message sent to office to set up fu appt due to no hosp fu availability within 14 days) (7/5/2024  3:34 PM)  Has the patient kept scheduled appointments due by today?: Yes (7/5/2024  3:34 PM)  Care Management Interventions: Advised to schedule with specialist (FU with cardiology 7/10, needs fu with Dr Vora) (7/5/2024  3:34 PM)  Follow Up Tasks: Appointments (7/5/2024  3:34 PM)    Self Management  What is the home health agency?: N/A (7/5/2024  3:34 PM)  What Durable Medical Equipment (DME) was ordered?: N/A (7/5/2024  3:34 PM)    Patient Teaching  Does the patient have access to their discharge instructions?: Yes (7/5/2024  3:34 PM)  Care Management  Interventions: Reviewed instructions with patient (7/5/2024  3:34 PM)  What is the patient's perception of their health status since discharge?: Returned to baseline/stable (7/5/2024  3:34 PM)  Is the patient/caregiver able to teach back the hierarchy of who to call/visit for symptoms/problems? PCP, Specialist, Home Health nurse, Urgent Care, ED, 911: Yes (7/5/2024  3:34 PM)  Patient/Caregiver Education Comments: Aware to stop all nsaids, seek care for any return of dark stools, states stools are still slightly dark due to iron. (7/5/2024  3:34 PM)    Wrap Up  Wrap Up Additional Comments: Vega is doing ok, wearing cardiac event monitor, has fu with cardiology next week. Will need CBC to recheck next week at some point. No other concerns at this time. (7/5/2024  3:34 PM)  Call End Time: 1536 (7/5/2024  3:34 PM)

## 2024-07-10 ENCOUNTER — TELEPHONE (OUTPATIENT)
Dept: PRIMARY CARE | Facility: CLINIC | Age: 64
End: 2024-07-10

## 2024-07-10 ENCOUNTER — APPOINTMENT (OUTPATIENT)
Dept: CARDIOLOGY | Facility: CLINIC | Age: 64
End: 2024-07-10
Payer: MEDICARE

## 2024-07-10 VITALS
BODY MASS INDEX: 22.38 KG/M2 | SYSTOLIC BLOOD PRESSURE: 122 MMHG | WEIGHT: 180 LBS | DIASTOLIC BLOOD PRESSURE: 72 MMHG | HEIGHT: 75 IN

## 2024-07-10 DIAGNOSIS — R00.1 SINUS BRADYCARDIA: Primary | ICD-10-CM

## 2024-07-10 DIAGNOSIS — I73.1 BUERGER'S DISEASE (CMS-HCC): ICD-10-CM

## 2024-07-10 LAB
ATRIAL RATE: 52 BPM
P AXIS: 24 DEGREES
P OFFSET: 166 MS
P ONSET: 112 MS
PR INTERVAL: 230 MS
Q ONSET: 227 MS
QRS COUNT: 9 BEATS
QRS DURATION: 98 MS
QT INTERVAL: 466 MS
QTC CALCULATION(BAZETT): 433 MS
QTC FREDERICIA: 444 MS
R AXIS: -49 DEGREES
T AXIS: 7 DEGREES
T OFFSET: 460 MS
VENTRICULAR RATE: 52 BPM

## 2024-07-10 PROCEDURE — 1159F MED LIST DOCD IN RCRD: CPT | Performed by: NURSE PRACTITIONER

## 2024-07-10 PROCEDURE — 1123F ACP DISCUSS/DSCN MKR DOCD: CPT | Performed by: NURSE PRACTITIONER

## 2024-07-10 PROCEDURE — 1111F DSCHRG MED/CURRENT MED MERGE: CPT | Performed by: NURSE PRACTITIONER

## 2024-07-10 PROCEDURE — 99214 OFFICE O/P EST MOD 30 MIN: CPT | Performed by: NURSE PRACTITIONER

## 2024-07-10 PROCEDURE — 3078F DIAST BP <80 MM HG: CPT | Performed by: NURSE PRACTITIONER

## 2024-07-10 PROCEDURE — 3048F LDL-C <100 MG/DL: CPT | Performed by: NURSE PRACTITIONER

## 2024-07-10 PROCEDURE — 3074F SYST BP LT 130 MM HG: CPT | Performed by: NURSE PRACTITIONER

## 2024-07-10 PROCEDURE — 3044F HG A1C LEVEL LT 7.0%: CPT | Performed by: NURSE PRACTITIONER

## 2024-07-10 NOTE — PROGRESS NOTES
Vega Rowe is a 64 y.o. male     History Of Present Illness   Mr Rowe is a 64 year old with severe BUE digital ischemia  in the background of hypertension and smoking/ tobacco exposure. C/w Raynanel's and Latisha, who was recently discharged with anemia related to a GI bleed.  While hospitalized, he was noted to be bradycardic with a heart rate in the 30's.  He currently has a holter monitor on until 24.  He denies any complaints of chest pain, shortness of breath, leg edema, worsening ischemia, or syncope.  He does complain of dizziness that he attributes to his anemia.  He admits he did not follow up with Dr Aguiar as directed.  He did quit smoking approx 4 months ago.      Social HX  Social History     Tobacco Use    Smoking status: Former     Current packs/day: 0.00     Average packs/day: 1 pack/day for 50.0 years (50.0 ttl pk-yrs)     Types: Cigarettes     Start date: 1973     Quit date: 2023     Years since quittin.2    Smokeless tobacco: Never   Vaping Use    Vaping status: Never Used   Substance Use Topics    Alcohol use: Yes     Alcohol/week: 10.0 standard drinks of alcohol     Types: 10 Cans of beer per week     Comment: occasional    Drug use: Never          Family HX  Family History   Problem Relation Name Age of Onset    Breast cancer Mother  49    Aneurysm Father          of abdominal aorta    Asthma Sister            Review Of Systems   Constitutional: not feeling tired.   Eyes: no eyesight problems.  No vision loss or change in vision  ENT: no hearing loss and no nosebleeds.   Cardiovascular: No intermittent leg claudication,   No chest pain, no tightness or heavy pressure  No shortness of breath,  No palpitations,  No lower extremity edema  The heart rate is regular  +bilateral finger ischemia  Respiratory: no chronic cough and no shortness of breath.   Gastrointestinal: no change in bowel habits and no blood in stools.   Genitourinary: no urinary frequency.   Skin: no skin  rashes.   Neurological: No frequent falls.   + dizziness  No weakness  Denies headaches  Psychiatric: no depression and not suicidal.   All other systems have been reviewed and are negative for complaint.        Allergies  No Known Allergies       Vitals  Visit Vitals  /72 (BP Location: Left arm, Patient Position: Sitting, BP Cuff Size: Adult)           Physical Exam  Constitutional: alert and in no acute distress.   Eyes: no erythema, swelling or discharge from the eye .   Neck: neck is supple, symmetric, trachea midline, no masses  and no thyromegaly .   Pulmonary: No increased work of breathing or signs of respiratory distress    Lungs clear to auscultation.    No friction rub.  Cardiovascular: carotid pulses 2+ bilaterally with no bruit    JVP was normal, no thrills ,   Regular rhythm, HR 60 today and regular.  Holter monitor on.  normal S1 and S2, no murmurs    Pedal pulses 2+ bilaterally    No edema .   +ischemic fingers bilaterally  Abdomen: abdomen non-tender, no masses  and no hepatomegaly . No pulsatile mass noted  Skin: skin warm and dry, normal skin turgor .   Psychiatric judgment and insight is normal  and oriented to person, place and time .           Current/Home Meds    Current Outpatient Medications:     acetaminophen (Tylenol) 325 mg tablet, Take 1 tablet (325 mg) by mouth every 6 hours if needed for mild pain (1 - 3)., Disp: , Rfl:     amLODIPine (Norvasc) 10 mg tablet, Take 1 tablet (10 mg) by mouth once daily., Disp: 90 tablet, Rfl: 3    aspirin 81 mg EC tablet, Take 1 tablet (81 mg) by mouth once daily., Disp: , Rfl:     cilostazol (Pletal) 100 mg tablet, Take 1 tablet (100 mg) by mouth 2 times a day., Disp: 60 tablet, Rfl: 11    ferrous sulfate 325 (65 Fe) MG EC tablet, Take 1 tablet by mouth every other day. Do not crush, chew, or split., Disp: 15 tablet, Rfl: 0    gabapentin (Neurontin) 600 mg tablet, Take 1 tablet (600 mg) by mouth 3 times a day., Disp: 90 tablet, Rfl: 5    isosorbide  mononitrate ER (Imdur) 30 mg 24 hr tablet, Take 1 tablet (30 mg) by mouth once daily. Do not crush or chew., Disp: 30 tablet, Rfl: 11    pantoprazole (ProtoNix) 40 mg EC tablet, Take 1 tablet (40 mg) by mouth 2 times a day. Do not crush, chew, or split., Disp: 60 tablet, Rfl: 2    nitroglycerin (Nitro-Bid) 2 % ointment, Place 0.5 inches on the skin every 6 hours., Disp: 30 g, Rfl: 0       Labs               EKG:  Sinus bradycardia with 1st degree AV block  Left axis deviation  Incomplete right bundle branch block  Abnormal ECG  When compared with ECG of 03-JUL-2024 12:07, (unconfirmed)  DC interval has increased  Cardiac Service Results:        Assessment/Plan      BRADYCARDIA:  While hospitalized, he was noted to be bradycardic with a HR in the 30's.  He currently has a holter monitor on until 7/17.  His HR on exam today is 60.  I will have him follow up with EP in 3 weeks to discuss results.  Per Dr Chavez, patient will need to be evaluated for a pacemaker.      Bergers Disease: Patient did quit smoking, however the tips of his fingers are ischemic. He did not see Dr Aguiar as directed.  Will have him schedule an evaluation today.  Continue pletal as directed (patient is unsure if he is taking this at this time).
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
## 2024-07-12 ENCOUNTER — OFFICE VISIT (OUTPATIENT)
Dept: PRIMARY CARE | Facility: CLINIC | Age: 64
End: 2024-07-12
Payer: MEDICARE

## 2024-07-12 VITALS
DIASTOLIC BLOOD PRESSURE: 60 MMHG | HEART RATE: 107 BPM | OXYGEN SATURATION: 98 % | WEIGHT: 179 LBS | TEMPERATURE: 97.6 F | SYSTOLIC BLOOD PRESSURE: 100 MMHG | BODY MASS INDEX: 22.26 KG/M2 | HEIGHT: 75 IN

## 2024-07-12 DIAGNOSIS — R00.1 SINUS BRADYCARDIA: ICD-10-CM

## 2024-07-12 DIAGNOSIS — K92.2 GASTROINTESTINAL HEMORRHAGE, UNSPECIFIED GASTROINTESTINAL HEMORRHAGE TYPE: Primary | ICD-10-CM

## 2024-07-12 DIAGNOSIS — I96: ICD-10-CM

## 2024-07-12 DIAGNOSIS — R93.1 HIGH CORONARY ARTERY CALCIUM SCORE: ICD-10-CM

## 2024-07-12 DIAGNOSIS — Z85.828 HISTORY OF BASAL CELL CARCINOMA (BCC): ICD-10-CM

## 2024-07-12 DIAGNOSIS — K83.1 BILIARY STRICTURE (CMS-HCC): ICD-10-CM

## 2024-07-12 DIAGNOSIS — R94.31 ABNORMAL ELECTROCARDIOGRAM (ECG) (EKG): ICD-10-CM

## 2024-07-12 DIAGNOSIS — N02.B9 PRIMARY IGA NEPHROPATHY: ICD-10-CM

## 2024-07-12 DIAGNOSIS — K86.1 CHRONIC PANCREATITIS, UNSPECIFIED PANCREATITIS TYPE (MULTI): ICD-10-CM

## 2024-07-12 DIAGNOSIS — I73.1 BUERGER'S DISEASE (CMS-HCC): ICD-10-CM

## 2024-07-12 PROBLEM — R42 LIGHTHEADEDNESS: Status: RESOLVED | Noted: 2024-04-16 | Resolved: 2024-07-12

## 2024-07-12 PROCEDURE — 99495 TRANSJ CARE MGMT MOD F2F 14D: CPT | Performed by: INTERNAL MEDICINE

## 2024-07-12 PROCEDURE — 1123F ACP DISCUSS/DSCN MKR DOCD: CPT | Performed by: INTERNAL MEDICINE

## 2024-07-12 PROCEDURE — 1111F DSCHRG MED/CURRENT MED MERGE: CPT | Performed by: INTERNAL MEDICINE

## 2024-07-12 PROCEDURE — 3078F DIAST BP <80 MM HG: CPT | Performed by: INTERNAL MEDICINE

## 2024-07-12 PROCEDURE — 1036F TOBACCO NON-USER: CPT | Performed by: INTERNAL MEDICINE

## 2024-07-12 PROCEDURE — 1160F RVW MEDS BY RX/DR IN RCRD: CPT | Performed by: INTERNAL MEDICINE

## 2024-07-12 PROCEDURE — 3048F LDL-C <100 MG/DL: CPT | Performed by: INTERNAL MEDICINE

## 2024-07-12 PROCEDURE — 3074F SYST BP LT 130 MM HG: CPT | Performed by: INTERNAL MEDICINE

## 2024-07-12 PROCEDURE — 3044F HG A1C LEVEL LT 7.0%: CPT | Performed by: INTERNAL MEDICINE

## 2024-07-12 PROCEDURE — 1159F MED LIST DOCD IN RCRD: CPT | Performed by: INTERNAL MEDICINE

## 2024-07-12 NOTE — PROGRESS NOTES
"Patient: Vega Rowe  : 1960  PCP: Kathleen Sena MD  MRN: [id number]  Program: Transitional Care Management  Status: Enrolled  Effective Dates: 2024 - present  Responsible Staff: Alida Delgado RN  Social Determinants to be Addressed: No information to display         Vega Rowe is a 64 y.o. male presenting today for follow-up after being discharged from the hospital 9 days ago. The main problem requiring admission was gastroentestinalbleed form NSAID use needed transfusion . The discharge summary and/or Transitional Care Management documentation was reviewed. Medication reconciliation was performed as indicated via the \"Patrick as Reviewed\" timestamp.     Vega Rowe was contacted by Transitional Care Management services two days after his discharge. This encounter and supporting documentation was reviewed.    Review of Systems    /60 (BP Location: Left arm, Patient Position: Sitting)   Pulse 107   Temp 36.4 °C (97.6 °F) (Temporal)   Ht 1.905 m (6' 3\")   Wt 81.2 kg (179 lb)   SpO2 98%   BMI 22.37 kg/m²     Physical Exam    The complexity of medical decision making for this patient's transitional care is moderate.    Assessment/Plan   Problem List Items Addressed This Visit             ICD-10-CM    Biliary stricture (CMS-HCC) K83.1    Chronic pancreatitis (Multi) K86.1    High coronary artery calcium score R93.1    Gangrene of finger (Multi) I96    Abnormal electrocardiogram (ECG) (EKG) R94.31    Sinus bradycardia R00.1    History of basal cell carcinoma (BCC) Z85.828    Primary IgA nephropathy N02.B9    Buerger's disease (CMS-HCC) I73.1    Gastrointestinal hemorrhage, unspecified gastrointestinal hemorrhage type - Primary K92.2    Relevant Orders    CBC    Comprehensive Metabolic Panel    Iron and TIBC    Ferritin       "

## 2024-07-16 ENCOUNTER — PATIENT OUTREACH (OUTPATIENT)
Dept: CARE COORDINATION | Facility: CLINIC | Age: 64
End: 2024-07-16
Payer: MEDICARE

## 2024-07-16 NOTE — PROGRESS NOTES
Unable to reach patient for call back after patient's follow up appointment with PCP.   ADELINEM with call back number for patient to call if needed   If no voicemail available call attempts x 2 were made to contact the patient to assist with any questions or concerns patient may have.

## 2024-07-18 ENCOUNTER — APPOINTMENT (OUTPATIENT)
Dept: PRIMARY CARE | Facility: CLINIC | Age: 64
End: 2024-07-18
Payer: MEDICARE

## 2024-07-18 LAB
LABORATORY COMMENT REPORT: NORMAL
PATH REPORT.FINAL DX SPEC: NORMAL
PATH REPORT.GROSS SPEC: NORMAL
PATH REPORT.RELEVANT HX SPEC: NORMAL
PATH REPORT.TOTAL CANCER: NORMAL

## 2024-07-28 PROBLEM — R00.1 SINUS BRADYCARDIA: Chronic | Status: ACTIVE | Noted: 2024-04-16

## 2024-07-28 NOTE — PROGRESS NOTES
Reason For Consult    Bradycardia    History Of Present Illness    This is a 64-year-old male with a history of hypertension.  He is being seen today in consultation at the request of CHAUNCEY Traore for evaluation of bradycardia.  The patient was found to be bradycardic during a hospitalization when he had a GI bleed, and subsequently used a Holter monitor.  He denies having any fatigue, lightheadedness, dizziness, or syncope.  He reports no prior history of problems with bradycardia.  Available hospital records and cardiology records reviewed.    Past medical history:    Hypertension  GI bleed  Duodenal ulcer  Raynaud's  Upper extremity digital ischemia    Past surgical history: Denies any major surgical procedures    Social history: Former smoker, previously was a 50-pack-year smoker    Family history: Father  at 79 years of age, no history of premature CAD or sudden cardiac death     Review of systems  Other review of systems negative other than as outlined in HPI     Social History  He reports that he quit smoking about 15 months ago. His smoking use included cigarettes. He started smoking about 51 years ago. He has a 50 pack-year smoking history. He has never used smokeless tobacco. He reports current alcohol use of about 10.0 standard drinks of alcohol per week. He reports that he does not use drugs.    Family History  Family History   Problem Relation Name Age of Onset    Breast cancer Mother  49    Aneurysm Father          of abdominal aorta    Asthma Sister          Allergies  Patient has no known allergies.    Medications  Current Outpatient Medications   Medication Instructions    acetaminophen (TYLENOL) 325 mg, oral, Every 6 hours PRN    amLODIPine (NORVASC) 10 mg, oral, Daily    aspirin 81 mg, oral, Daily    cilostazol (PLETAL) 100 mg, oral, 2 times daily    ferrous sulfate 325 (65 Fe) MG EC tablet 1 tablet, oral, Every other day, Do not crush, chew, or split.    gabapentin (NEURONTIN) 600  "mg, oral, 3 times daily    isosorbide mononitrate ER (IMDUR) 30 mg, oral, Daily, Do not crush or chew.    pantoprazole (PROTONIX) 40 mg, oral, 2 times daily, Do not crush, chew, or split.     Vitals      7/3/2024     7:47 PM 7/3/2024    11:33 PM 7/4/2024     4:02 AM 7/4/2024     7:53 AM 7/4/2024    11:17 AM 7/10/2024     9:23 AM 7/12/2024     9:38 AM   Vitals   Systolic 109 107 120 145 135 122 100   Diastolic 55 61 68 67 69 72 60   Heart Rate 52 53 52 57 58  107   Temp 36.3 °C (97.3 °F) 36.4 °C (97.5 °F) 36.6 °C (97.9 °F) 36.7 °C (98.1 °F) 36.5 °C (97.7 °F)  36.4 °C (97.6 °F)   Resp 18 18 18       Height (in)      1.905 m (6' 3\") 1.905 m (6' 3\")   Weight (lb)      180 179   BMI      22.5 kg/m2 22.37 kg/m2   BSA (m2)      2.08 m2 2.07 m2   Visit Report      Report Report     Physical Exam    General Appearance:  Alert, oriented, no distress  Skin:  Warm and dry  Head and Neck:  No elevation of JVP, no carotid bruits  Cardiac Exam:  Rhythm is regular, S1 and S2 are normal, no murmur S3 or S4  Lungs:  Clear to auscultation  Extremities:  no edema  Neurologic:  No focal deficits  Psychiatric:  Appropriate mood and behavior       Test Results    I have personally reviewed the following diagnostic studies and interpretation is as follows:    EKG July 3, 2024: Normal sinus rhythm, first-degree AV block  Holter monitor July 22, 2024: Normal sinus rhythm, average heart rate 66 bpm.     Assessment/Plan  Problem List Items Addressed This Visit             ICD-10-CM    Sinus bradycardia - Primary (Chronic) R00.1     1.  Sinus bradycardia: The patient has had mild sinus bradycardia and episodes of second-degree AV block type I.  He has been asymptomatic and there is no pacemaker indication at this time.  The patient will follow-up with me as needed if his cardiac status changes.    2.  Hypertension: Today's blood pressure reading is slightly elevated, but other readings have been normal.  He does not monitor his blood pressure " regularly.  Continue same medication regimen.            James C Ramicone, DO

## 2024-07-29 ENCOUNTER — APPOINTMENT (OUTPATIENT)
Dept: CARDIOLOGY | Facility: CLINIC | Age: 64
End: 2024-07-29
Payer: MEDICARE

## 2024-07-29 VITALS
SYSTOLIC BLOOD PRESSURE: 146 MMHG | HEIGHT: 75 IN | BODY MASS INDEX: 22.63 KG/M2 | DIASTOLIC BLOOD PRESSURE: 84 MMHG | WEIGHT: 182 LBS

## 2024-07-29 DIAGNOSIS — R00.1 SINUS BRADYCARDIA: Primary | ICD-10-CM

## 2024-07-29 PROCEDURE — 3077F SYST BP >= 140 MM HG: CPT | Performed by: INTERNAL MEDICINE

## 2024-07-29 PROCEDURE — 1123F ACP DISCUSS/DSCN MKR DOCD: CPT | Performed by: INTERNAL MEDICINE

## 2024-07-29 PROCEDURE — 3044F HG A1C LEVEL LT 7.0%: CPT | Performed by: INTERNAL MEDICINE

## 2024-07-29 PROCEDURE — 1159F MED LIST DOCD IN RCRD: CPT | Performed by: INTERNAL MEDICINE

## 2024-07-29 PROCEDURE — 1111F DSCHRG MED/CURRENT MED MERGE: CPT | Performed by: INTERNAL MEDICINE

## 2024-07-29 PROCEDURE — 3048F LDL-C <100 MG/DL: CPT | Performed by: INTERNAL MEDICINE

## 2024-07-29 PROCEDURE — 1036F TOBACCO NON-USER: CPT | Performed by: INTERNAL MEDICINE

## 2024-07-29 PROCEDURE — 3008F BODY MASS INDEX DOCD: CPT | Performed by: INTERNAL MEDICINE

## 2024-07-29 PROCEDURE — 99214 OFFICE O/P EST MOD 30 MIN: CPT | Performed by: INTERNAL MEDICINE

## 2024-07-29 PROCEDURE — 3079F DIAST BP 80-89 MM HG: CPT | Performed by: INTERNAL MEDICINE

## 2024-07-29 NOTE — LETTER
2024     Kathleen Sena MD  [address]  [facility] [place], [address]  [facility] [address]    Patient: Vega Rowe   YOB: 1960   Date of Visit: 2024       Dear Dr. Kathleen Sena MD:    Thank you for referring Vega Rowe to me for evaluation. Below are my notes for this consultation.  If you have questions, please do not hesitate to call me. I look forward to following your patient along with you.       Sincerely,     James C Ramicone, DO      CC: No Recipients  ______________________________________________________________________________________    Reason For Consult    Bradycardia    History Of Present Illness    This is a 64-year-old male with a history of hypertension.  He is being seen today in consultation at the request of CHAUNCEY Traore for evaluation of bradycardia.  The patient was found to be bradycardic during a hospitalization when he had a GI bleed, and subsequently used a Holter monitor.  He denies having any fatigue, lightheadedness, dizziness, or syncope.  He reports no prior history of problems with bradycardia.  Available hospital records and cardiology records reviewed.    Past medical history:    Hypertension  GI bleed  Duodenal ulcer  Raynaud's  Upper extremity digital ischemia    Past surgical history: Denies any major surgical procedures    Social history: Former smoker, previously was a 50-pack-year smoker    Family history: Father  at 79 years of age, no history of premature CAD or sudden cardiac death     Review of systems  Other review of systems negative other than as outlined in HPI     Social History  He reports that he quit smoking about 15 months ago. His smoking use included cigarettes. He started smoking about 51 years ago. He has a 50 pack-year smoking history. He has never used smokeless tobacco. He reports current alcohol use of about 10.0 standard drinks of alcohol per week. He reports that he does not use  "drugs.    Family History  Family History   Problem Relation Name Age of Onset   • Breast cancer Mother  49   • Aneurysm Father          of abdominal aorta   • Asthma Sister          Allergies  Patient has no known allergies.    Medications  Current Outpatient Medications   Medication Instructions   • acetaminophen (TYLENOL) 325 mg, oral, Every 6 hours PRN   • amLODIPine (NORVASC) 10 mg, oral, Daily   • aspirin 81 mg, oral, Daily   • cilostazol (PLETAL) 100 mg, oral, 2 times daily   • ferrous sulfate 325 (65 Fe) MG EC tablet 1 tablet, oral, Every other day, Do not crush, chew, or split.   • gabapentin (NEURONTIN) 600 mg, oral, 3 times daily   • isosorbide mononitrate ER (IMDUR) 30 mg, oral, Daily, Do not crush or chew.   • pantoprazole (PROTONIX) 40 mg, oral, 2 times daily, Do not crush, chew, or split.     Vitals      7/3/2024     7:47 PM 7/3/2024    11:33 PM 7/4/2024     4:02 AM 7/4/2024     7:53 AM 7/4/2024    11:17 AM 7/10/2024     9:23 AM 7/12/2024     9:38 AM   Vitals   Systolic 109 107 120 145 135 122 100   Diastolic 55 61 68 67 69 72 60   Heart Rate 52 53 52 57 58  107   Temp 36.3 °C (97.3 °F) 36.4 °C (97.5 °F) 36.6 °C (97.9 °F) 36.7 °C (98.1 °F) 36.5 °C (97.7 °F)  36.4 °C (97.6 °F)   Resp 18 18 18       Height (in)      1.905 m (6' 3\") 1.905 m (6' 3\")   Weight (lb)      180 179   BMI      22.5 kg/m2 22.37 kg/m2   BSA (m2)      2.08 m2 2.07 m2   Visit Report      Report Report     Physical Exam    General Appearance:  Alert, oriented, no distress  Skin:  Warm and dry  Head and Neck:  No elevation of JVP, no carotid bruits  Cardiac Exam:  Rhythm is regular, S1 and S2 are normal, no murmur S3 or S4  Lungs:  Clear to auscultation  Extremities:  no edema  Neurologic:  No focal deficits  Psychiatric:  Appropriate mood and behavior       Test Results    I have personally reviewed the following diagnostic studies and interpretation is as follows:    EKG July 3, 2024: Normal sinus rhythm, first-degree AV " block  Holter monitor July 22, 2024: Normal sinus rhythm, average heart rate 66 bpm.     Assessment/Plan  Problem List Items Addressed This Visit             ICD-10-CM    Sinus bradycardia - Primary (Chronic) R00.1     1.  Sinus bradycardia: The patient has had mild sinus bradycardia and episodes of second-degree AV block type I.  He has been asymptomatic and there is no pacemaker indication at this time.  The patient will follow-up with me as needed if his cardiac status changes.    2.  Hypertension: Today's blood pressure reading is slightly elevated, but other readings have been normal.  He does not monitor his blood pressure regularly.  Continue same medication regimen.            James C Ramicone, DO

## 2024-07-29 NOTE — ASSESSMENT & PLAN NOTE
1.  Sinus bradycardia: The patient has had mild sinus bradycardia and episodes of second-degree AV block type I.  He has been asymptomatic and there is no pacemaker indication at this time.  The patient will follow-up with me as needed if his cardiac status changes.    2.  Hypertension: Today's blood pressure reading is slightly elevated, but other readings have been normal.  He does not monitor his blood pressure regularly.  Continue same medication regimen.

## 2024-08-04 LAB — BODY SURFACE AREA: 2.05 M2

## 2024-08-05 ENCOUNTER — TELEPHONE (OUTPATIENT)
Dept: CARDIOLOGY | Facility: CLINIC | Age: 64
End: 2024-08-05
Payer: MEDICARE

## 2024-08-05 NOTE — TELEPHONE ENCOUNTER
----- Message from Laureano Long sent at 8/4/2024 12:04 PM EDT -----  Looks OK    PP  ----- Message -----  From: James C Ramicone, DO  Sent: 8/4/2024   7:43 AM EDT  To: Laureano Long MD

## 2024-08-05 NOTE — TELEPHONE ENCOUNTER
Called patient and LM with his monitor results and to call me back if he had any further questions.

## 2024-08-21 ENCOUNTER — PATIENT OUTREACH (OUTPATIENT)
Dept: CARE COORDINATION | Facility: CLINIC | Age: 64
End: 2024-08-21
Payer: MEDICARE

## 2024-09-09 DIAGNOSIS — I73.1 BUERGER'S DISEASE (CMS-HCC): ICD-10-CM

## 2024-09-10 ENCOUNTER — APPOINTMENT (OUTPATIENT)
Dept: CARDIOLOGY | Facility: CLINIC | Age: 64
End: 2024-09-10
Payer: MEDICARE

## 2024-09-11 RX ORDER — ISOSORBIDE MONONITRATE 30 MG/1
30 TABLET, EXTENDED RELEASE ORAL DAILY
Qty: 30 TABLET | Refills: 1 | Status: SHIPPED | OUTPATIENT
Start: 2024-09-11 | End: 2024-11-10

## 2024-09-11 RX ORDER — CILOSTAZOL 100 MG/1
100 TABLET ORAL 2 TIMES DAILY
Qty: 60 TABLET | Refills: 1 | Status: SHIPPED | OUTPATIENT
Start: 2024-09-11 | End: 2024-11-10

## 2024-09-26 ENCOUNTER — PATIENT OUTREACH (OUTPATIENT)
Dept: PRIMARY CARE | Facility: CLINIC | Age: 64
End: 2024-09-26
Payer: MEDICARE

## 2024-10-20 DIAGNOSIS — L03.019 CELLULITIS OF FINGER, UNSPECIFIED LATERALITY: Primary | ICD-10-CM

## 2024-10-20 RX ORDER — CEPHALEXIN 500 MG/1
500 CAPSULE ORAL 3 TIMES DAILY
Qty: 30 CAPSULE | Refills: 0 | Status: SHIPPED | OUTPATIENT
Start: 2024-10-20 | End: 2024-10-30

## 2024-11-26 ENCOUNTER — APPOINTMENT (OUTPATIENT)
Dept: PRIMARY CARE | Facility: CLINIC | Age: 64
End: 2024-11-26
Payer: MEDICARE

## 2024-12-13 DIAGNOSIS — I99.8 ISCHEMIA OF DIGITS OF HAND: ICD-10-CM

## 2024-12-13 DIAGNOSIS — I96: ICD-10-CM

## 2024-12-13 RX ORDER — GABAPENTIN 600 MG/1
600 TABLET ORAL 3 TIMES DAILY
Qty: 90 TABLET | Refills: 5 | Status: SHIPPED | OUTPATIENT
Start: 2024-12-13 | End: 2025-06-11

## 2025-02-17 DIAGNOSIS — I96: ICD-10-CM

## 2025-02-17 DIAGNOSIS — N02.B9 BERGER'S DISEASE: ICD-10-CM

## 2025-02-17 RX ORDER — AMLODIPINE BESYLATE 10 MG/1
10 TABLET ORAL DAILY
Qty: 90 TABLET | Refills: 3 | Status: SHIPPED | OUTPATIENT
Start: 2025-02-17 | End: 2026-02-17

## 2025-06-02 DIAGNOSIS — I73.1 BUERGER'S DISEASE: ICD-10-CM

## 2025-06-02 RX ORDER — CILOSTAZOL 100 MG/1
1 TABLET ORAL
COMMUNITY
Start: 2025-04-01 | End: 2025-06-02 | Stop reason: SDUPTHER

## 2025-06-04 RX ORDER — ISOSORBIDE MONONITRATE 30 MG/1
30 TABLET, EXTENDED RELEASE ORAL DAILY
Qty: 30 TABLET | Refills: 11 | Status: SHIPPED | OUTPATIENT
Start: 2025-06-04 | End: 2025-07-04

## 2025-06-04 RX ORDER — CILOSTAZOL 100 MG/1
100 TABLET ORAL
Qty: 60 TABLET | Refills: 0 | Status: SHIPPED | OUTPATIENT
Start: 2025-06-04 | End: 2025-07-04

## 2025-06-05 DIAGNOSIS — I73.1 BUERGER'S DISEASE: ICD-10-CM

## 2025-06-09 RX ORDER — CILOSTAZOL 100 MG/1
100 TABLET ORAL
Qty: 60 TABLET | Refills: 0 | Status: SHIPPED | OUTPATIENT
Start: 2025-06-09 | End: 2025-07-09

## 2025-06-09 RX ORDER — ISOSORBIDE MONONITRATE 30 MG/1
30 TABLET, EXTENDED RELEASE ORAL DAILY
Qty: 30 TABLET | Refills: 0 | Status: SHIPPED | OUTPATIENT
Start: 2025-06-09 | End: 2025-07-09

## (undated) DEVICE — ELECTRODE PT RET AD L9FT HI MOIST COND ADH HYDRGEL CORDED

## (undated) DEVICE — SUTURE ETHLN SZ 3-0 L18IN NONABSORBABLE BLK PS-2 L19MM 3/8 1669H

## (undated) DEVICE — SET,IRRIGATION,CYSTO/TUR: Brand: MEDLINE

## (undated) DEVICE — SUTURE ETHLN SZ 4-0 L18IN NONABSORBABLE BLK L19MM PS-2 3/8 1667H

## (undated) DEVICE — HAND II: Brand: MEDLINE INDUSTRIES, INC.

## (undated) DEVICE — LABEL MED MINI W/ MARKER

## (undated) DEVICE — GOWN,AURORA,NONREINFORCED,LARGE: Brand: MEDLINE

## (undated) DEVICE — GLOVE ORANGE PI 7   MSG9070

## (undated) DEVICE — TOURNIQUET PHLEB L EXSANGUINATING FOR AD DGT TOURNI-COT

## (undated) DEVICE — SINGLE PORT MANIFOLD: Brand: NEPTUNE 2

## (undated) DEVICE — TOURNIQUET PHLEB M AD FNGR RED SIL RNG DISP TOURNI-COT

## (undated) DEVICE — SPONGE GZ W4XL4IN COT 12 PLY TYP VII WVN C FLD DSGN STERILE

## (undated) DEVICE — TUBING, SUCTION, 9/32" X 12', STRAIGHT: Brand: MEDLINE INDUSTRIES, INC.

## (undated) DEVICE — WRAP COHESIVE W2INXL5YD TAN SELF ADH BNDG HND NON STERILE TEAR CARING

## (undated) DEVICE — 1010 S-DRAPE TOWEL DRAPE 10/BX: Brand: STERI-DRAPE™

## (undated) DEVICE — DRESSING PETRO W3XL8IN OIL EMUL N ADH GZ KNIT IMPREG CELOS

## (undated) DEVICE — GLOVE ORANGE PI 7 1/2   MSG9075

## (undated) DEVICE — ZIMMER® STERILE DISPOSABLE TOURNIQUET CUFF, DUAL PORT, SINGLE BLADDER, 18 IN. (46 CM)

## (undated) DEVICE — SUTURE CHROMIC GUT SZ 4-0 L18IN ABSRB BRN L13MM P-3 3/8 CIR 1654G

## (undated) DEVICE — NEPTUNE E-SEP SMOKE EVACUATION PENCIL, COATED, 70MM BLADE, PUSH BUTTON SWITCH: Brand: NEPTUNE E-SEP

## (undated) DEVICE — APPLICATOR MEDICATED 26 CC SOLUTION HI LT ORNG CHLORAPREP

## (undated) DEVICE — GLOVE ORTHO 7 1/2   MSG9475